# Patient Record
Sex: MALE | Race: WHITE | Employment: OTHER | URBAN - METROPOLITAN AREA
[De-identification: names, ages, dates, MRNs, and addresses within clinical notes are randomized per-mention and may not be internally consistent; named-entity substitution may affect disease eponyms.]

---

## 2021-10-25 ENCOUNTER — HOSPITAL ENCOUNTER (INPATIENT)
Age: 71
LOS: 5 days | Discharge: HOME | DRG: 291 | End: 2021-10-30
Attending: EMERGENCY MEDICINE | Admitting: FAMILY MEDICINE
Payer: MEDICARE

## 2021-10-25 ENCOUNTER — APPOINTMENT (OUTPATIENT)
Dept: CT IMAGING | Age: 71
DRG: 291 | End: 2021-10-25
Payer: MEDICARE

## 2021-10-25 ENCOUNTER — APPOINTMENT (OUTPATIENT)
Dept: GENERAL RADIOLOGY | Age: 71
DRG: 291 | End: 2021-10-25
Payer: MEDICARE

## 2021-10-25 DIAGNOSIS — I50.9 DECOMPENSATED HEART FAILURE (HCC): Primary | ICD-10-CM

## 2021-10-25 DIAGNOSIS — J90 PLEURAL EFFUSION: ICD-10-CM

## 2021-10-25 DIAGNOSIS — R18.8 OTHER ASCITES: ICD-10-CM

## 2021-10-25 LAB
ABSOLUTE EOS #: 0.09 K/UL (ref 0–0.44)
ABSOLUTE IMMATURE GRANULOCYTE: 0.09 K/UL (ref 0–0.3)
ABSOLUTE LYMPH #: 0.95 K/UL (ref 1.1–3.7)
ABSOLUTE MONO #: 1.03 K/UL (ref 0.1–1.2)
ANION GAP SERPL CALCULATED.3IONS-SCNC: 15 MMOL/L (ref 9–17)
BASOPHILS # BLD: 1 % (ref 0–2)
BASOPHILS ABSOLUTE: 0.09 K/UL (ref 0–0.2)
BNP INTERPRETATION: ABNORMAL
BUN BLDV-MCNC: 53 MG/DL (ref 8–23)
BUN/CREAT BLD: 32 (ref 9–20)
CALCIUM SERPL-MCNC: 8.9 MG/DL (ref 8.6–10.4)
CHLORIDE BLD-SCNC: 100 MMOL/L (ref 98–107)
CO2: 19 MMOL/L (ref 20–31)
CREAT SERPL-MCNC: 1.65 MG/DL (ref 0.7–1.2)
DIFFERENTIAL TYPE: ABNORMAL
EOSINOPHILS RELATIVE PERCENT: 1 % (ref 1–4)
GFR AFRICAN AMERICAN: 50 ML/MIN
GFR NON-AFRICAN AMERICAN: 41 ML/MIN
GFR SERPL CREATININE-BSD FRML MDRD: ABNORMAL ML/MIN/{1.73_M2}
GFR SERPL CREATININE-BSD FRML MDRD: ABNORMAL ML/MIN/{1.73_M2}
GLUCOSE BLD-MCNC: 217 MG/DL (ref 70–99)
HCT VFR BLD CALC: 26.7 % (ref 40.7–50.3)
HEMOGLOBIN: 7.9 G/DL (ref 13–17)
IMMATURE GRANULOCYTES: 1 %
INR BLD: 1.7
LACTIC ACID: 3.7 MMOL/L (ref 0.5–2.2)
LYMPHOCYTES # BLD: 11 % (ref 24–43)
MAGNESIUM: 2.1 MG/DL (ref 1.6–2.6)
MCH RBC QN AUTO: 21.8 PG (ref 25.2–33.5)
MCHC RBC AUTO-ENTMCNC: 29.6 G/DL (ref 28.4–34.8)
MCV RBC AUTO: 73.8 FL (ref 82.6–102.9)
MONOCYTES # BLD: 12 % (ref 3–12)
MORPHOLOGY: ABNORMAL
MORPHOLOGY: ABNORMAL
NRBC AUTOMATED: 0 PER 100 WBC
PARTIAL THROMBOPLASTIN TIME: 49.6 SEC (ref 23.9–33.8)
PDW BLD-RTO: 20.6 % (ref 11.8–14.4)
PLATELET # BLD: 340 K/UL (ref 138–453)
PLATELET ESTIMATE: ABNORMAL
PMV BLD AUTO: 9.8 FL (ref 8.1–13.5)
POTASSIUM SERPL-SCNC: 4.3 MMOL/L (ref 3.7–5.3)
PRO-BNP: 3171 PG/ML
PROTHROMBIN TIME: 19.2 SEC (ref 11.5–14.2)
RBC # BLD: 3.62 M/UL (ref 4.21–5.77)
RBC # BLD: ABNORMAL 10*6/UL
SEG NEUTROPHILS: 74 % (ref 36–65)
SEGMENTED NEUTROPHILS ABSOLUTE COUNT: 6.35 K/UL (ref 1.5–8.1)
SODIUM BLD-SCNC: 134 MMOL/L (ref 135–144)
TROPONIN INTERP: ABNORMAL
TROPONIN T: ABNORMAL NG/ML
TROPONIN, HIGH SENSITIVITY: 58 NG/L (ref 0–22)
WBC # BLD: 8.6 K/UL (ref 3.5–11.3)
WBC # BLD: ABNORMAL 10*3/UL

## 2021-10-25 PROCEDURE — 99283 EMERGENCY DEPT VISIT LOW MDM: CPT

## 2021-10-25 PROCEDURE — 74176 CT ABD & PELVIS W/O CONTRAST: CPT

## 2021-10-25 PROCEDURE — 2580000003 HC RX 258: Performed by: EMERGENCY MEDICINE

## 2021-10-25 PROCEDURE — 83735 ASSAY OF MAGNESIUM: CPT

## 2021-10-25 PROCEDURE — 85730 THROMBOPLASTIN TIME PARTIAL: CPT

## 2021-10-25 PROCEDURE — 93005 ELECTROCARDIOGRAM TRACING: CPT | Performed by: EMERGENCY MEDICINE

## 2021-10-25 PROCEDURE — 80048 BASIC METABOLIC PNL TOTAL CA: CPT

## 2021-10-25 PROCEDURE — 83880 ASSAY OF NATRIURETIC PEPTIDE: CPT

## 2021-10-25 PROCEDURE — 81001 URINALYSIS AUTO W/SCOPE: CPT

## 2021-10-25 PROCEDURE — 71045 X-RAY EXAM CHEST 1 VIEW: CPT

## 2021-10-25 PROCEDURE — 96360 HYDRATION IV INFUSION INIT: CPT

## 2021-10-25 PROCEDURE — 85610 PROTHROMBIN TIME: CPT

## 2021-10-25 PROCEDURE — 2060000000 HC ICU INTERMEDIATE R&B

## 2021-10-25 PROCEDURE — 2500000003 HC RX 250 WO HCPCS: Performed by: EMERGENCY MEDICINE

## 2021-10-25 PROCEDURE — 84484 ASSAY OF TROPONIN QUANT: CPT

## 2021-10-25 PROCEDURE — 6360000002 HC RX W HCPCS: Performed by: EMERGENCY MEDICINE

## 2021-10-25 PROCEDURE — 83605 ASSAY OF LACTIC ACID: CPT

## 2021-10-25 PROCEDURE — 85025 COMPLETE CBC W/AUTO DIFF WBC: CPT

## 2021-10-25 RX ORDER — COLCHICINE 0.6 MG/1
0.6 CAPSULE ORAL DAILY
COMMUNITY

## 2021-10-25 RX ORDER — PIOGLITAZONEHYDROCHLORIDE 45 MG/1
45 TABLET ORAL DAILY
Status: ON HOLD | COMMUNITY
End: 2021-10-29 | Stop reason: HOSPADM

## 2021-10-25 RX ORDER — DILTIAZEM HYDROCHLORIDE 5 MG/ML
10 INJECTION INTRAVENOUS ONCE
Status: COMPLETED | OUTPATIENT
Start: 2021-10-25 | End: 2021-10-25

## 2021-10-25 RX ORDER — ATORVASTATIN CALCIUM 10 MG/1
10 TABLET, FILM COATED ORAL DAILY
COMMUNITY

## 2021-10-25 RX ORDER — FUROSEMIDE 10 MG/ML
40 INJECTION INTRAMUSCULAR; INTRAVENOUS ONCE
Status: COMPLETED | OUTPATIENT
Start: 2021-10-25 | End: 2021-10-25

## 2021-10-25 RX ORDER — METOPROLOL SUCCINATE 50 MG/1
50 TABLET, EXTENDED RELEASE ORAL DAILY
COMMUNITY

## 2021-10-25 RX ORDER — 0.9 % SODIUM CHLORIDE 0.9 %
500 INTRAVENOUS SOLUTION INTRAVENOUS ONCE
Status: COMPLETED | OUTPATIENT
Start: 2021-10-25 | End: 2021-10-25

## 2021-10-25 RX ORDER — LOSARTAN POTASSIUM 50 MG/1
50 TABLET ORAL DAILY
Status: ON HOLD | COMMUNITY
End: 2021-10-26 | Stop reason: CLARIF

## 2021-10-25 RX ORDER — FUROSEMIDE 20 MG/1
20 TABLET ORAL DAILY
Status: ON HOLD | COMMUNITY
End: 2021-10-29 | Stop reason: SDUPTHER

## 2021-10-25 RX ORDER — ALLOPURINOL 300 MG/1
300 TABLET ORAL DAILY
Status: ON HOLD | COMMUNITY
End: 2021-10-29 | Stop reason: HOSPADM

## 2021-10-25 RX ADMIN — FUROSEMIDE 40 MG: 10 INJECTION, SOLUTION INTRAMUSCULAR; INTRAVENOUS at 23:10

## 2021-10-25 RX ADMIN — SODIUM CHLORIDE 500 ML: 9 INJECTION, SOLUTION INTRAVENOUS at 21:59

## 2021-10-25 RX ADMIN — DILTIAZEM HYDROCHLORIDE 10 MG: 5 INJECTION INTRAVENOUS at 23:10

## 2021-10-25 ASSESSMENT — ENCOUNTER SYMPTOMS
NAUSEA: 0
RHINORRHEA: 0
SHORTNESS OF BREATH: 1
DIARRHEA: 0
SORE THROAT: 0
VOMITING: 0
EYE DISCHARGE: 0
COLOR CHANGE: 0
COUGH: 0
EYE REDNESS: 0

## 2021-10-26 ENCOUNTER — APPOINTMENT (OUTPATIENT)
Dept: ULTRASOUND IMAGING | Age: 71
DRG: 291 | End: 2021-10-26
Payer: MEDICARE

## 2021-10-26 LAB
-: ABNORMAL
AFP: 0.9 UG/L
ALBUMIN SERPL-MCNC: 3.5 G/DL (ref 3.5–5.2)
ALBUMIN/GLOBULIN RATIO: ABNORMAL (ref 1–2.5)
ALP BLD-CCNC: 139 U/L (ref 40–129)
ALPHA-1 ANTITRYPSIN: 248 MG/DL (ref 90–200)
ALT SERPL-CCNC: 46 U/L (ref 5–41)
AMORPHOUS: ABNORMAL
ANION GAP SERPL CALCULATED.3IONS-SCNC: 13 MMOL/L (ref 9–17)
AST SERPL-CCNC: 57 U/L
BACTERIA: ABNORMAL
BILIRUB SERPL-MCNC: 0.73 MG/DL (ref 0.3–1.2)
BILIRUBIN DIRECT: 0.4 MG/DL
BILIRUBIN URINE: NEGATIVE
BILIRUBIN, INDIRECT: 0.33 MG/DL (ref 0–1)
BUN BLDV-MCNC: 49 MG/DL (ref 8–23)
BUN/CREAT BLD: 34 (ref 9–20)
CALCIUM SERPL-MCNC: 8.5 MG/DL (ref 8.6–10.4)
CASTS UA: ABNORMAL /LPF
CASTS UA: ABNORMAL /LPF
CERULOPLASMIN: 41 MG/DL (ref 15–30)
CHLORIDE BLD-SCNC: 103 MMOL/L (ref 98–107)
CO2: 20 MMOL/L (ref 20–31)
COLOR: YELLOW
COMMENT UA: ABNORMAL
CREAT SERPL-MCNC: 1.45 MG/DL (ref 0.7–1.2)
CRYSTALS, UA: ABNORMAL /HPF
DATE, STOOL #1: NORMAL
DATE, STOOL #2: NORMAL
DATE, STOOL #3: NORMAL
EKG ATRIAL RATE: 125 BPM
EKG ATRIAL RATE: 65 BPM
EKG Q-T INTERVAL: 326 MS
EKG Q-T INTERVAL: 354 MS
EKG QRS DURATION: 90 MS
EKG QRS DURATION: 98 MS
EKG QTC CALCULATION (BAZETT): 447 MS
EKG QTC CALCULATION (BAZETT): 461 MS
EKG R AXIS: -25 DEGREES
EKG R AXIS: -35 DEGREES
EKG T AXIS: 72 DEGREES
EKG T AXIS: 73 DEGREES
EKG VENTRICULAR RATE: 102 BPM
EKG VENTRICULAR RATE: 113 BPM
EPITHELIAL CELLS UA: ABNORMAL /HPF (ref 0–5)
ESTIMATED AVERAGE GLUCOSE: 140 MG/DL
FERRITIN: 51 UG/L (ref 30–400)
FOLATE: 14.4 NG/ML
GFR AFRICAN AMERICAN: 58 ML/MIN
GFR NON-AFRICAN AMERICAN: 48 ML/MIN
GFR SERPL CREATININE-BSD FRML MDRD: ABNORMAL ML/MIN/{1.73_M2}
GFR SERPL CREATININE-BSD FRML MDRD: ABNORMAL ML/MIN/{1.73_M2}
GLOBULIN: ABNORMAL G/DL (ref 1.5–3.8)
GLUCOSE BLD-MCNC: 118 MG/DL (ref 75–110)
GLUCOSE BLD-MCNC: 130 MG/DL (ref 75–110)
GLUCOSE BLD-MCNC: 143 MG/DL (ref 70–99)
GLUCOSE BLD-MCNC: 146 MG/DL (ref 75–110)
GLUCOSE BLD-MCNC: 190 MG/DL (ref 75–110)
GLUCOSE URINE: NEGATIVE
HAV IGM SER IA-ACNC: NONREACTIVE
HBA1C MFR BLD: 6.5 % (ref 4–6)
HCT VFR BLD CALC: 23.2 % (ref 40.7–50.3)
HCT VFR BLD CALC: 27.8 % (ref 40.7–50.3)
HEMOCCULT SP1 STL QL: NEGATIVE
HEMOCCULT SP2 STL QL: NORMAL
HEMOCCULT SP3 STL QL: NORMAL
HEMOGLOBIN: 6.8 G/DL (ref 13–17)
HEMOGLOBIN: 8.1 G/DL (ref 13–17)
HEPATITIS B CORE IGM ANTIBODY: NONREACTIVE
HEPATITIS B SURFACE ANTIGEN: NONREACTIVE
HEPATITIS C ANTIBODY: NONREACTIVE
IGA: 453 MG/DL (ref 70–400)
IGM: 39 MG/DL (ref 40–230)
IRON SATURATION: 5 % (ref 20–55)
IRON: 17 UG/DL (ref 59–158)
KETONES, URINE: NEGATIVE
LACTIC ACID: 2.1 MMOL/L (ref 0.5–2.2)
LEUKOCYTE ESTERASE, URINE: NEGATIVE
LV EF: 60 %
LVEF MODALITY: NORMAL
MAGNESIUM: 2 MG/DL (ref 1.6–2.6)
MCH RBC QN AUTO: 21.3 PG (ref 25.2–33.5)
MCHC RBC AUTO-ENTMCNC: 29.3 G/DL (ref 28.4–34.8)
MCV RBC AUTO: 72.5 FL (ref 82.6–102.9)
MUCUS: ABNORMAL
NITRITE, URINE: NEGATIVE
NRBC AUTOMATED: 0 PER 100 WBC
OTHER OBSERVATIONS UA: ABNORMAL
PDW BLD-RTO: 20.3 % (ref 11.8–14.4)
PH UA: 5 (ref 5–8)
PLATELET # BLD: 310 K/UL (ref 138–453)
PMV BLD AUTO: 9.4 FL (ref 8.1–13.5)
POTASSIUM SERPL-SCNC: 3.6 MMOL/L (ref 3.7–5.3)
PROTEIN UA: NEGATIVE
RBC # BLD: 3.2 M/UL (ref 4.21–5.77)
RBC UA: ABNORMAL /HPF (ref 0–2)
RENAL EPITHELIAL, UA: ABNORMAL /HPF
SODIUM BLD-SCNC: 136 MMOL/L (ref 135–144)
SPECIFIC GRAVITY UA: 1.01 (ref 1–1.03)
TIME, STOOL #1: 1420
TIME, STOOL #2: NORMAL
TIME, STOOL #3: NORMAL
TOTAL IRON BINDING CAPACITY: 339 UG/DL (ref 250–450)
TOTAL PROTEIN: 6.8 G/DL (ref 6.4–8.3)
TRICHOMONAS: ABNORMAL
TROPONIN INTERP: ABNORMAL
TROPONIN T: ABNORMAL NG/ML
TROPONIN, HIGH SENSITIVITY: 54 NG/L (ref 0–22)
TURBIDITY: CLEAR
UNSATURATED IRON BINDING CAPACITY: 322 UG/DL (ref 112–347)
URINE HGB: ABNORMAL
UROBILINOGEN, URINE: NORMAL
WBC # BLD: 7.1 K/UL (ref 3.5–11.3)
WBC UA: ABNORMAL /HPF (ref 0–5)
YEAST: ABNORMAL

## 2021-10-26 PROCEDURE — 82746 ASSAY OF FOLIC ACID SERUM: CPT

## 2021-10-26 PROCEDURE — 82784 ASSAY IGA/IGD/IGG/IGM EACH: CPT

## 2021-10-26 PROCEDURE — 83540 ASSAY OF IRON: CPT

## 2021-10-26 PROCEDURE — 82272 OCCULT BLD FECES 1-3 TESTS: CPT

## 2021-10-26 PROCEDURE — 86663 EPSTEIN-BARR ANTIBODY: CPT

## 2021-10-26 PROCEDURE — 84484 ASSAY OF TROPONIN QUANT: CPT

## 2021-10-26 PROCEDURE — 86665 EPSTEIN-BARR CAPSID VCA: CPT

## 2021-10-26 PROCEDURE — 36415 COLL VENOUS BLD VENIPUNCTURE: CPT

## 2021-10-26 PROCEDURE — 2580000003 HC RX 258: Performed by: FAMILY MEDICINE

## 2021-10-26 PROCEDURE — 86920 COMPATIBILITY TEST SPIN: CPT

## 2021-10-26 PROCEDURE — 87040 BLOOD CULTURE FOR BACTERIA: CPT

## 2021-10-26 PROCEDURE — 86645 CMV ANTIBODY IGM: CPT

## 2021-10-26 PROCEDURE — 76705 ECHO EXAM OF ABDOMEN: CPT

## 2021-10-26 PROCEDURE — 83605 ASSAY OF LACTIC ACID: CPT

## 2021-10-26 PROCEDURE — 86038 ANTINUCLEAR ANTIBODIES: CPT

## 2021-10-26 PROCEDURE — 36430 TRANSFUSION BLD/BLD COMPNT: CPT

## 2021-10-26 PROCEDURE — 85018 HEMOGLOBIN: CPT

## 2021-10-26 PROCEDURE — 86376 MICROSOMAL ANTIBODY EACH: CPT

## 2021-10-26 PROCEDURE — 82105 ALPHA-FETOPROTEIN SERUM: CPT

## 2021-10-26 PROCEDURE — 99222 1ST HOSP IP/OBS MODERATE 55: CPT | Performed by: INTERNAL MEDICINE

## 2021-10-26 PROCEDURE — 93010 ELECTROCARDIOGRAM REPORT: CPT | Performed by: INTERNAL MEDICINE

## 2021-10-26 PROCEDURE — 83516 IMMUNOASSAY NONANTIBODY: CPT

## 2021-10-26 PROCEDURE — 2060000000 HC ICU INTERMEDIATE R&B

## 2021-10-26 PROCEDURE — 82728 ASSAY OF FERRITIN: CPT

## 2021-10-26 PROCEDURE — 82390 ASSAY OF CERULOPLASMIN: CPT

## 2021-10-26 PROCEDURE — 83036 HEMOGLOBIN GLYCOSYLATED A1C: CPT

## 2021-10-26 PROCEDURE — 80048 BASIC METABOLIC PNL TOTAL CA: CPT

## 2021-10-26 PROCEDURE — 85014 HEMATOCRIT: CPT

## 2021-10-26 PROCEDURE — 86225 DNA ANTIBODY NATIVE: CPT

## 2021-10-26 PROCEDURE — 83735 ASSAY OF MAGNESIUM: CPT

## 2021-10-26 PROCEDURE — 80074 ACUTE HEPATITIS PANEL: CPT

## 2021-10-26 PROCEDURE — 6370000000 HC RX 637 (ALT 250 FOR IP): Performed by: FAMILY MEDICINE

## 2021-10-26 PROCEDURE — 80076 HEPATIC FUNCTION PANEL: CPT

## 2021-10-26 PROCEDURE — 85027 COMPLETE CBC AUTOMATED: CPT

## 2021-10-26 PROCEDURE — APPNB30 APP NON BILLABLE TIME 0-30 MINS: Performed by: NURSE PRACTITIONER

## 2021-10-26 PROCEDURE — 83550 IRON BINDING TEST: CPT

## 2021-10-26 PROCEDURE — 2500000003 HC RX 250 WO HCPCS: Performed by: FAMILY MEDICINE

## 2021-10-26 PROCEDURE — 86850 RBC ANTIBODY SCREEN: CPT

## 2021-10-26 PROCEDURE — 86900 BLOOD TYPING SEROLOGIC ABO: CPT

## 2021-10-26 PROCEDURE — P9016 RBC LEUKOCYTES REDUCED: HCPCS

## 2021-10-26 PROCEDURE — 82103 ALPHA-1-ANTITRYPSIN TOTAL: CPT

## 2021-10-26 PROCEDURE — 93306 TTE W/DOPPLER COMPLETE: CPT

## 2021-10-26 PROCEDURE — 82947 ASSAY GLUCOSE BLOOD QUANT: CPT

## 2021-10-26 PROCEDURE — 93005 ELECTROCARDIOGRAM TRACING: CPT | Performed by: EMERGENCY MEDICINE

## 2021-10-26 PROCEDURE — 86644 CMV ANTIBODY: CPT

## 2021-10-26 PROCEDURE — C9113 INJ PANTOPRAZOLE SODIUM, VIA: HCPCS | Performed by: FAMILY MEDICINE

## 2021-10-26 PROCEDURE — 6360000002 HC RX W HCPCS: Performed by: FAMILY MEDICINE

## 2021-10-26 PROCEDURE — 86901 BLOOD TYPING SEROLOGIC RH(D): CPT

## 2021-10-26 PROCEDURE — 86664 EPSTEIN-BARR NUCLEAR ANTIGEN: CPT

## 2021-10-26 RX ORDER — ZOLPIDEM TARTRATE 5 MG/1
5 TABLET ORAL NIGHTLY PRN
Status: DISCONTINUED | OUTPATIENT
Start: 2021-10-27 | End: 2021-10-26

## 2021-10-26 RX ORDER — METOPROLOL TARTRATE 5 MG/5ML
5 INJECTION INTRAVENOUS EVERY 6 HOURS
Status: DISCONTINUED | OUTPATIENT
Start: 2021-10-26 | End: 2021-10-26

## 2021-10-26 RX ORDER — ACETAMINOPHEN 650 MG/1
650 SUPPOSITORY RECTAL EVERY 6 HOURS PRN
Status: DISCONTINUED | OUTPATIENT
Start: 2021-10-26 | End: 2021-10-30 | Stop reason: HOSPADM

## 2021-10-26 RX ORDER — ONDANSETRON 2 MG/ML
4 INJECTION INTRAMUSCULAR; INTRAVENOUS EVERY 6 HOURS PRN
Status: DISCONTINUED | OUTPATIENT
Start: 2021-10-26 | End: 2021-10-30 | Stop reason: HOSPADM

## 2021-10-26 RX ORDER — POTASSIUM CHLORIDE 7.45 MG/ML
10 INJECTION INTRAVENOUS PRN
Status: DISCONTINUED | OUTPATIENT
Start: 2021-10-26 | End: 2021-10-30 | Stop reason: HOSPADM

## 2021-10-26 RX ORDER — ACETAMINOPHEN 325 MG/1
650 TABLET ORAL EVERY 6 HOURS PRN
Status: DISCONTINUED | OUTPATIENT
Start: 2021-10-26 | End: 2021-10-30 | Stop reason: HOSPADM

## 2021-10-26 RX ORDER — DEXTROSE MONOHYDRATE 50 MG/ML
100 INJECTION, SOLUTION INTRAVENOUS PRN
Status: DISCONTINUED | OUTPATIENT
Start: 2021-10-26 | End: 2021-10-30 | Stop reason: HOSPADM

## 2021-10-26 RX ORDER — DEXTROSE MONOHYDRATE 25 G/50ML
12.5 INJECTION, SOLUTION INTRAVENOUS PRN
Status: DISCONTINUED | OUTPATIENT
Start: 2021-10-26 | End: 2021-10-30 | Stop reason: HOSPADM

## 2021-10-26 RX ORDER — SODIUM CHLORIDE 0.9 % (FLUSH) 0.9 %
5-40 SYRINGE (ML) INJECTION PRN
Status: DISCONTINUED | OUTPATIENT
Start: 2021-10-26 | End: 2021-10-30 | Stop reason: HOSPADM

## 2021-10-26 RX ORDER — METOPROLOL SUCCINATE 50 MG/1
50 TABLET, EXTENDED RELEASE ORAL DAILY
Status: DISCONTINUED | OUTPATIENT
Start: 2021-10-26 | End: 2021-10-30 | Stop reason: HOSPADM

## 2021-10-26 RX ORDER — ALLOPURINOL 300 MG/1
300 TABLET ORAL DAILY
Status: DISCONTINUED | OUTPATIENT
Start: 2021-10-26 | End: 2021-10-26

## 2021-10-26 RX ORDER — NICOTINE POLACRILEX 4 MG
15 LOZENGE BUCCAL PRN
Status: DISCONTINUED | OUTPATIENT
Start: 2021-10-26 | End: 2021-10-30 | Stop reason: HOSPADM

## 2021-10-26 RX ORDER — SODIUM CHLORIDE 0.9 % (FLUSH) 0.9 %
5-40 SYRINGE (ML) INJECTION EVERY 12 HOURS SCHEDULED
Status: DISCONTINUED | OUTPATIENT
Start: 2021-10-26 | End: 2021-10-26

## 2021-10-26 RX ORDER — SODIUM CHLORIDE 0.9 % (FLUSH) 0.9 %
5-40 SYRINGE (ML) INJECTION PRN
Status: DISCONTINUED | OUTPATIENT
Start: 2021-10-26 | End: 2021-10-26

## 2021-10-26 RX ORDER — SODIUM CHLORIDE 9 MG/ML
INJECTION, SOLUTION INTRAVENOUS PRN
Status: DISCONTINUED | OUTPATIENT
Start: 2021-10-26 | End: 2021-10-30 | Stop reason: HOSPADM

## 2021-10-26 RX ORDER — ATORVASTATIN CALCIUM 10 MG/1
10 TABLET, FILM COATED ORAL DAILY
Status: DISCONTINUED | OUTPATIENT
Start: 2021-10-26 | End: 2021-10-30 | Stop reason: HOSPADM

## 2021-10-26 RX ORDER — FUROSEMIDE 10 MG/ML
20 INJECTION INTRAMUSCULAR; INTRAVENOUS ONCE
Status: COMPLETED | OUTPATIENT
Start: 2021-10-26 | End: 2021-10-26

## 2021-10-26 RX ORDER — MAGNESIUM SULFATE 1 G/100ML
1000 INJECTION INTRAVENOUS PRN
Status: DISCONTINUED | OUTPATIENT
Start: 2021-10-26 | End: 2021-10-30 | Stop reason: HOSPADM

## 2021-10-26 RX ORDER — POTASSIUM CHLORIDE 20 MEQ/1
40 TABLET, EXTENDED RELEASE ORAL PRN
Status: DISCONTINUED | OUTPATIENT
Start: 2021-10-26 | End: 2021-10-30 | Stop reason: HOSPADM

## 2021-10-26 RX ORDER — SODIUM CHLORIDE 0.9 % (FLUSH) 0.9 %
5-40 SYRINGE (ML) INJECTION EVERY 12 HOURS SCHEDULED
Status: DISCONTINUED | OUTPATIENT
Start: 2021-10-26 | End: 2021-10-30 | Stop reason: HOSPADM

## 2021-10-26 RX ORDER — ONDANSETRON 4 MG/1
4 TABLET, ORALLY DISINTEGRATING ORAL EVERY 8 HOURS PRN
Status: DISCONTINUED | OUTPATIENT
Start: 2021-10-26 | End: 2021-10-30 | Stop reason: HOSPADM

## 2021-10-26 RX ORDER — ACETAMINOPHEN 325 MG/1
650 TABLET ORAL EVERY 4 HOURS PRN
Status: DISCONTINUED | OUTPATIENT
Start: 2021-10-26 | End: 2021-10-26

## 2021-10-26 RX ORDER — PANTOPRAZOLE SODIUM 40 MG/10ML
40 INJECTION, POWDER, LYOPHILIZED, FOR SOLUTION INTRAVENOUS DAILY
Status: DISCONTINUED | OUTPATIENT
Start: 2021-10-26 | End: 2021-10-30 | Stop reason: CLARIF

## 2021-10-26 RX ORDER — POLYETHYLENE GLYCOL 3350 17 G/17G
17 POWDER, FOR SOLUTION ORAL DAILY PRN
Status: DISCONTINUED | OUTPATIENT
Start: 2021-10-26 | End: 2021-10-30 | Stop reason: HOSPADM

## 2021-10-26 RX ORDER — ALLOPURINOL 100 MG/1
100 TABLET ORAL DAILY
Status: DISCONTINUED | OUTPATIENT
Start: 2021-10-26 | End: 2021-10-30 | Stop reason: HOSPADM

## 2021-10-26 RX ORDER — LOSARTAN POTASSIUM 50 MG/1
50 TABLET ORAL DAILY
Status: DISCONTINUED | OUTPATIENT
Start: 2021-10-26 | End: 2021-10-30 | Stop reason: HOSPADM

## 2021-10-26 RX ORDER — INSULIN GLARGINE 100 [IU]/ML
30 INJECTION, SOLUTION SUBCUTANEOUS NIGHTLY
Status: DISCONTINUED | OUTPATIENT
Start: 2021-10-26 | End: 2021-10-30 | Stop reason: HOSPADM

## 2021-10-26 RX ORDER — SODIUM CHLORIDE 9 MG/ML
25 INJECTION, SOLUTION INTRAVENOUS PRN
Status: DISCONTINUED | OUTPATIENT
Start: 2021-10-26 | End: 2021-10-26

## 2021-10-26 RX ORDER — ZOLPIDEM TARTRATE 5 MG/1
5 TABLET ORAL NIGHTLY PRN
Status: DISCONTINUED | OUTPATIENT
Start: 2021-10-26 | End: 2021-10-30 | Stop reason: HOSPADM

## 2021-10-26 RX ORDER — SODIUM CHLORIDE 9 MG/ML
25 INJECTION, SOLUTION INTRAVENOUS PRN
Status: DISCONTINUED | OUTPATIENT
Start: 2021-10-26 | End: 2021-10-30 | Stop reason: HOSPADM

## 2021-10-26 RX ADMIN — ATORVASTATIN CALCIUM 10 MG: 10 TABLET, FILM COATED ORAL at 10:43

## 2021-10-26 RX ADMIN — ALLOPURINOL 100 MG: 100 TABLET ORAL at 10:42

## 2021-10-26 RX ADMIN — METOPROLOL SUCCINATE 50 MG: 50 TABLET, EXTENDED RELEASE ORAL at 10:43

## 2021-10-26 RX ADMIN — FUROSEMIDE 20 MG: 10 INJECTION, SOLUTION INTRAMUSCULAR; INTRAVENOUS at 06:49

## 2021-10-26 RX ADMIN — FUROSEMIDE 20 MG: 10 INJECTION, SOLUTION INTRAMUSCULAR; INTRAVENOUS at 12:43

## 2021-10-26 RX ADMIN — INSULIN LISPRO 2 UNITS: 100 INJECTION, SOLUTION INTRAVENOUS; SUBCUTANEOUS at 12:39

## 2021-10-26 RX ADMIN — PANTOPRAZOLE SODIUM 40 MG: 40 INJECTION, POWDER, FOR SOLUTION INTRAVENOUS at 10:43

## 2021-10-26 RX ADMIN — ZOLPIDEM TARTRATE 5 MG: 5 TABLET ORAL at 22:18

## 2021-10-26 RX ADMIN — METOPROLOL TARTRATE 5 MG: 5 INJECTION INTRAVENOUS at 02:30

## 2021-10-26 RX ADMIN — INSULIN GLARGINE 30 UNITS: 100 INJECTION, SOLUTION SUBCUTANEOUS at 22:18

## 2021-10-26 RX ADMIN — SODIUM CHLORIDE, PRESERVATIVE FREE 10 ML: 5 INJECTION INTRAVENOUS at 22:20

## 2021-10-26 RX ADMIN — SODIUM CHLORIDE, PRESERVATIVE FREE 10 ML: 5 INJECTION INTRAVENOUS at 10:43

## 2021-10-26 ASSESSMENT — PAIN SCALES - GENERAL: PAINLEVEL_OUTOF10: 0

## 2021-10-26 NOTE — PROGRESS NOTES
Pt. alcon hgb came back at 6.8. Dr. Nalini Davila called and will type and cross 2 units and run one now. Cardiology consulted and in to see patient. GI consult placed as well. Questioning GI bleed.

## 2021-10-26 NOTE — PROGRESS NOTES
Comprehensive Nutrition Assessment    Type and Reason for Visit:  Initial, Positive Nutrition Screen, Patient Education (Decreased appetite; malnutrition score:1)    Nutrition Recommendations/Plan:   1. Continue ADULT DIET; Regular; 4 carb choices (60 gm/meal); Low Fat/Low Chol/High Fiber/KARIN; Low Sodium (2 gm); 1800 ml  2. Monitor po intakes, need for ONS, weights and labs    Nutrition Assessment:  weakness, shortness of breath for the last several weeks. He was found to be anemic with hgb of 6.8- received blood transfusion. Plan for egd and colonoscopy. Patient also admitted for CHF. Patient wife at bedside- reports he had poor appetite/intake x1-2 weeks. He reports consuming 1 bowl of cereal and \"maybe few snacks but that was it\". No wt loss during this time althought he had significant fluid retention. Discussed CHF diet recommendations and monitoring sodium intake. Discussed ONS intake 1x/d for protein needs (especially when he goes days with minimal oral intake). Pt dtr works for [employer] and is able to get ONS. Discussed current diet along with fluid restriction. Explained reason for fluid restriction and encouraged to ask if he is to go home on fluid restriction. PO intake 50-75% this date. Will monitor po intakes, need for ONS, weights and labs. Malnutrition Assessment:  Malnutrition Status: At risk for malnutrition (Comment)    Context:  Acute Illness     Findings of the 6 clinical characteristics of malnutrition:  Energy Intake:  1 - 75% or less of estimated energy requirements for 7 or more days  Weight Loss:  No significant weight loss     Body Fat Loss:  Unable to assess     Muscle Mass Loss:  Unable to assess    Fluid Accumulation:  1 - Mild Extremities   Strength:  Not Performed    Estimated Daily Nutrient Needs:  Energy (kcal):  3023-5614 kcal (16-18 kcal/kg); Weight Used for Energy Requirements:  Current     Protein (g):   gm (1.2-1.4 g/kg);  Weight Used for Protein Requirements: Ideal        Fluid (ml/day):  1800 mL fluid restriction per physician; Method Used for Fluid Requirements:  Other (Comment)      Nutrition Related Findings:  Hgb 6.8 - received 1uPRBC. Active bowels- diarrhea. BLE +1 pitting edema. Wounds:  None       Current Nutrition Therapies:    ADULT DIET; Regular; 4 carb choices (60 gm/meal); Low Fat/Low Chol/High Fiber/KARIN; Low Sodium (2 gm); 1800 ml    Anthropometric Measures:  · Height: 6' (182.9 cm)  · Current Body Weight: 250 lb (113.4 kg)   · Admission Body Weight: 250 lb (113.4 kg)    · Usual Body Weight: 250 lb (113.4 kg)     · Ideal Body Weight: 178 lbs; % Ideal Body Weight 140.4 %   · BMI: 33.9  · Adjusted Body Weight:  ; No Adjustment  · BMI Categories: Obese Class 1 (BMI 30.0-34. 9)       Nutrition Diagnosis:   · Inadequate protein-energy intake related to inadequate protein-energy intake as evidenced by intake 51-75%, poor intake prior to admission    Nutrition Interventions:   Food and/or Nutrient Delivery:  Continue Current Diet  Nutrition Education/Counseling:  Education completed   Coordination of Nutrition Care:  Continue to monitor while inpatient    Goals:  PO intakes to provide >75% of estimated nutritional needs       Nutrition Monitoring and Evaluation:   Behavioral-Environmental Outcomes:  None Identified   Food/Nutrient Intake Outcomes:  Food and Nutrient Intake  Physical Signs/Symptoms Outcomes:  Biochemical Data, Weight, Fluid Status or Edema, Skin     Discharge Planning:    Continue current diet     Jean Luo, [address] [place] [address]  Office Number: [phone]

## 2021-10-26 NOTE — PLAN OF CARE
PRE CONSULT ROUNDING NOTE  HPI  69year old male with pmh of afib on eliquis dm who presented to the ED for weakness, shortness of breath for the last several weeks. He was found to be anemic with hgb of 6.8. he denies prior hx of anemia. Receiving blood transfusion now. Does not take NSAIDS. No melena hematochezia or hematemesis. inr 1.7 creat 1.45 alk phos 139 alt 46 ast 57 direct bili at 0.40 gallbladder us today shows non specific gallbladder wall thickening with trace ascites and possible chronic liver disease. Ct abd showed bilateral pleural effusions with non calcified lung nodules bilaterally, cardiomegaly hepatomegaly with slightly nodular liver diverticulosis and gallbladder wall thickening. He denies fevers chills weight loss dysphagia change in the bowel habits rash.      Endoscopy no egd states he had benign colon polyp removed several years ago  Family reports no hx of liver pancreatic stomach or colon cancer no uc/crohns  Social quit smoking drinks etoh once a month at the [facility] no illicit drugs   BP (!) 141/63   Pulse 89   Temp 98 °F (36.7 °C) (Oral)   Resp 16   Ht 6' (1.829 m)   Wt 250 lb (113.4 kg)   SpO2 99%   BMI 33.91 kg/m²      ROS as above meds labs imaging and past medical records were reviewed    Exam  General Appearance: alert and oriented to person, place and time, well-developed and well-nourished, in no acute distress  Skin: warm and dry, no rash or erythema  Head: normocephalic and atraumatic  Eyes: pupils equal, round, and reactive to light, extraocular eye movements intact, conjunctivae normal  ENT: hearing grossly normal bilaterally  Neck: neck supple and non tender without mass, no thyromegaly or thyroid nodules, no cervical lymphadenopathy   Pulmonary/Chest: clear to auscultation bilaterally- no wheezes, rales or rhonchi, normal air movement, no respiratory distress  Cardiovascular: normal rate, regular rhythm, normal S1 and S2, no murmurs, rubs, clicks or gallops, distal

## 2021-10-26 NOTE — ED PROVIDER NOTES
EMERGENCY DEPARTMENT ENCOUNTER    Pt Name: Mayra Chaidez  MRN: [id number]  [place] 1950  Date of evaluation: 10/25/21  CHIEF COMPLAINT       Chief Complaint   Patient presents with    Other     pt states he thinks hes anemic and his dr told him to go to ER to get checked out    Atrial Fibrillation     HISTORY OF PRESENT ILLNESS   69-year-old male presents with complaints of generalized weakness, shortness of breath and not feeling well. The patient was sent here by the family physician who stated that he looked extremely pale and was concerned about his hemoglobin. The patient denies any chest pain but does have some shortness of breath and states that she feels very ill. Patient denies any nausea vomiting fevers or chills. Patient has not noticed any blood in his stool or blood in the urine. REVIEW OF SYSTEMS     Review of Systems   Constitutional: Positive for fatigue. Negative for chills and fever. HENT: Negative for rhinorrhea and sore throat. Eyes: Negative for discharge, redness and visual disturbance. Respiratory: Positive for shortness of breath. Negative for cough. Cardiovascular: Negative for chest pain, palpitations and leg swelling. Gastrointestinal: Negative for diarrhea, nausea and vomiting. Musculoskeletal: Negative for arthralgias, myalgias and neck pain. Skin: Negative for color change and rash. Neurological: Positive for weakness. Negative for seizures and headaches. Psychiatric/Behavioral: Negative for hallucinations, self-injury and suicidal ideas. PASTMEDICAL HISTORY     Past Medical History:   Diagnosis Date    [facility]rial fibrillation ([facility]     Diabetes mellitus ([facility]      Past Problem List  Patient Active Problem List   Diagnosis Code    Decompensated heart failure ([facility] I50.9    Anemia D64.9    Elevated LFTs R79.89     SURGICAL HISTORY     History reviewed. No pertinent surgical history.   CURRENT MEDICATIONS       Current Discharge Medication List      CONTINUE these medications which have NOT CHANGED    Details   Semaglutide, 1 MG/DOSE, (OZEMPIC, 1 MG/DOSE,) 4 MG/3ML SOPN Inject 1 mg into the skin once a week       insulin glargine (BASAGLAR KWIKPEN) 100 UNIT/ML injection pen Inject 32 Units into the skin daily       metFORMIN (GLUCOPHAGE) 850 MG tablet Take 850 mg by mouth 2 times daily (with meals)      apixaban (ELIQUIS) 5 MG TABS tablet Take 5 mg by mouth 2 times daily       atorvastatin (LIPITOR) 10 MG tablet Take 10 mg by mouth daily      furosemide (LASIX) 20 MG tablet Take 20 mg by mouth daily       pioglitazone (ACTOS) 45 MG tablet Take 45 mg by mouth daily      allopurinol (ZYLOPRIM) 300 MG tablet Take 300 mg by mouth daily      colchicine (MITIGARE) 0.6 MG capsule Take 0.6 mg by mouth daily      metoprolol succinate (TOPROL XL) 50 MG extended release tablet Take 50 mg by mouth daily           ALLERGIES     has No Known Allergies. FAMILY HISTORY     has no family status information on file. SOCIAL HISTORY       Social History     Tobacco Use    Smoking status: Former Smoker   Substance Use Topics    Alcohol use: Yes    Drug use: Never     PHYSICAL EXAM     INITIAL VITALS: /73   Pulse 75   Temp 98.2 °F (36.8 °C) (Oral)   Resp 16   Ht 6' (1.829 m)   Wt 250 lb (113.4 kg)   SpO2 98%   BMI 33.91 kg/m²    Physical Exam  Constitutional:       Appearance: Normal appearance. He is well-developed. He is not ill-appearing or toxic-appearing. HENT:      Head: Normocephalic and atraumatic. Eyes:      Conjunctiva/sclera: Conjunctivae normal.      Pupils: Pupils are equal, round, and reactive to light. Neck:      Trachea: Trachea normal.   Cardiovascular:      Rate and Rhythm: Tachycardia present. Rhythm irregularly irregular. Heart sounds: S1 normal and S2 normal. No murmur heard. Pulmonary:      Effort: Pulmonary effort is normal. No accessory muscle usage or respiratory distress.       Breath sounds: Normal breath sounds. Chest:      Chest wall: No deformity or tenderness. Abdominal:      General: Bowel sounds are normal. There is no distension or abdominal bruit. Palpations: Abdomen is not rigid. Tenderness: There is no abdominal tenderness. There is no guarding or rebound. Musculoskeletal:      Cervical back: Normal range of motion and neck supple. Skin:     General: Skin is warm. Findings: No rash. Neurological:      Mental Status: He is alert and oriented to person, place, and time. GCS: GCS eye subscore is 4. GCS verbal subscore is 5. GCS motor subscore is 6. Psychiatric:         Speech: Speech normal.         MEDICAL DECISION MAKIN-year-old male presents with complaints of shortness of breath and generalized weakness. Plan is cardiac enzymes BNP and reevaluation. 10:41 PM EDT  Due to the patient's elevated lactic acid we obtained a CT of the chest and abdomen to evaluate for intraperitoneal and intrathoracic pathology. CRITICAL CARE:       PROCEDURES:    Procedures    DIAGNOSTIC RESULTS   EKG:All EKG's are interpreted by the Emergency Department Physician who either signs or Co-signs this chart in the absence of a cardiologist.    Patient's EKG shows A. fib with RVR, rate of 113, QRS QTC intervals unremarkable patient has left axis deviation no ST elevations or depressions, no significant T wave changes. Nonspecific EKG. RADIOLOGY:All plain film, CT, MRI, and formal ultrasound images (except ED bedside ultrasound) are read by the radiologist, see reports below, unless otherwisenoted in MDM or here. US GALLBLADDER RUQ   Final Result   1. Findings suggestive of chronic liver disease. 2.  Nonspecific gallbladder wall thickening without stones or convincing   evidence for acute cholecystitis. No biliary dilatation. 3.  Trace perihepatic ascites.          CT CHEST ABDOMEN PELVIS WO CONTRAST   Final Result   Bilateral left greater than right layering pleural effusions with adjacent   atelectasis. Small scattered areas of consolidation in the lung bases may be   due to superimposed infection in the appropriate clinical setting. Solid noncalcified nodules in the right upper, right middle, and left lower   lobes. See recommendation below. Cardiomegaly due to biatrial enlargement. Pericardial effusion. Hypodense intravascular blood pool which can be seen in the setting of   anemia. Correlate clinically. Hepatomegaly with suggestion of a slightly micronodular hepatic contour. Query possible underlying cirrhosis. Gallbladder wall thickening which may relate to underlying systemic cause   such as the patient's cardiac or suspected hepatic disease, though clinical   correlation is required to exclude acute cholecystitis. Colonic diverticulosis without evidence of diverticulitis. Small volume ascites, graying of the mesenteric fat, and anasarca. Grade 1 degenerative anterolisthesis L4 on L5 with moderate to severe spinal   canal and severe bilateral neuroforaminal stenosis. RECOMMENDATIONS:   Recommend a non-contrast Chest CT at 3-6 months, then another non-contrast   Chest CT at 18-24 months. Note: This recommendation does not apply to   patients younger than 35 years, immunocompromised patients, and patients with   cancer. F/u in patients with significant comorbidities as clinically   warranted. For lung cancer screening, adhere to Lung-RADS guidelines. Reference: Radiology. 2017 Jul; 284(1):228-243         XR CHEST PORTABLE   Final Result   Minimal left basilar atelectasis. Otherwise, clear lungs. Cardiomegaly. Trace left pleural effusion. LABS: All lab results were reviewed by myself, and all abnormals are listed below.   Labs Reviewed   BASIC METABOLIC PANEL - Abnormal; Notable for the following components:       Result Value    Glucose 217 (*)     BUN 53 (*)     CREATININE 1.65 (*)     Bun/Cre Ratio 32 (*)     Sodium 134 (*)     CO2 19 (*)     GFR Non- 41 (*)     GFR  50 (*)     All other components within normal limits   BRAIN NATRIURETIC PEPTIDE - Abnormal; Notable for the following components:    Pro-BNP 3,171 (*)     All other components within normal limits   CBC WITH AUTO DIFFERENTIAL - Abnormal; Notable for the following components:    RBC 3.62 (*)     Hemoglobin 7.9 (*)     Hematocrit 26.7 (*)     MCV 73.8 (*)     MCH 21.8 (*)     RDW 20.6 (*)     Seg Neutrophils 74 (*)     Lymphocytes 11 (*)     Immature Granulocytes 1 (*)     Absolute Lymph # 0.95 (*)     All other components within normal limits   APTT - Abnormal; Notable for the following components:    PTT 49.6 (*)     All other components within normal limits   PROTIME-INR - Abnormal; Notable for the following components:    Protime 19.2 (*)     All other components within normal limits   TROPONIN - Abnormal; Notable for the following components:    Troponin, High Sensitivity 58 (*)     All other components within normal limits   TROPONIN - Abnormal; Notable for the following components:    Troponin, High Sensitivity 54 (*)     All other components within normal limits   LACTIC ACID - Abnormal; Notable for the following components:    Lactic Acid 3.7 (*)     All other components within normal limits   URINALYSIS - Abnormal; Notable for the following components:    Urine Hgb TRACE (*)     All other components within normal limits   BASIC METABOLIC PANEL - Abnormal; Notable for the following components:    Glucose 143 (*)     BUN 49 (*)     CREATININE 1.45 (*)     Bun/Cre Ratio 34 (*)     Calcium 8.5 (*)     Potassium 3.6 (*)     GFR Non- 48 (*)     GFR  58 (*)     All other components within normal limits   CBC - Abnormal; Notable for the following components:    RBC 3.20 (*)     Hemoglobin 6.8 (*)     Hematocrit 23.2 (*)     MCV 72.5 (*)     MCH 21.3 (*)     RDW 20.3 (*)     All other components within normal limits   MICROSCOPIC URINALYSIS - Abnormal; Notable for the following components:    Bacteria, UA FEW (*)     All other components within normal limits   HEMOGLOBIN A1C - Abnormal; Notable for the following components:    Hemoglobin A1C 6.5 (*)     All other components within normal limits   IRON AND TIBC - Abnormal; Notable for the following components:    Iron 17 (*)     Iron Saturation 5 (*)     All other components within normal limits   HEPATIC FUNCTION PANEL - Abnormal; Notable for the following components:    Alkaline Phosphatase 139 (*)     ALT 46 (*)     AST 57 (*)     Bilirubin, Direct 0.40 (*)     All other components within normal limits   HEMOGLOBIN AND HEMATOCRIT, BLOOD - Abnormal; Notable for the following components:    Hemoglobin 8.1 (*)     Hematocrit 27.8 (*)     All other components within normal limits   POC GLUCOSE FINGERSTICK - Abnormal; Notable for the following components:    POC Glucose 130 (*)     All other components within normal limits   POC GLUCOSE FINGERSTICK - Abnormal; Notable for the following components:    POC Glucose 190 (*)     All other components within normal limits   CULTURE, BLOOD 1   CULTURE, BLOOD 1   MAGNESIUM   LACTIC ACID   MAGNESIUM   BLOOD OCCULT STOOL DIAGNOSTIC   FERRITIN   HEMOGLOBIN A1C   FOLATE   HEPATITIS PANEL, ACUTE   ALPHA-1-ANTITRYPSIN   MADHAVI   AFP TUMOR MARKER   IGA   CERULOPLASMIN   MITOCHONDRIAL ANTIBODIES, M2, IGG   ANTI-PETTY KID ZHANG AB   GIANNI-BARR VIRUS VCA ANTIBODY PANEL   CYTOMEGALOVIRUS ANTIBODY, IGM   CYTOMEGALOVIRUS ANTIBODY, IGG   SMOOTH MUSCLE ANTIBODY QUANT   IGM   POCT GLUCOSE   POCT GLUCOSE   POCT GLUCOSE   POCT GLUCOSE   TYPE AND SCREEN   PREPARE RBC (CROSSMATCH)       EMERGENCY DEPARTMENTCOURSE:         Vitals:    Vitals:    10/26/21 1158 10/26/21 1245 10/26/21 1549 10/26/21 1552   BP: 121/73 127/79 119/73    Pulse: 79 96 75    Resp: 18 12 16    Temp: 98.2 °F (36.8 °C) 98.3 °F (36.8 °C) 98.2 °F (36.8 °C) TempSrc: Oral Oral Oral    SpO2: 95% 99% 98%    Weight:       Height:    6' (1.829 m)       The patient was given the following medications while in the emergency department:  Orders Placed This Encounter   Medications    0.9 % sodium chloride bolus    furosemide (LASIX) injection 40 mg    dilTIAZem injection 10 mg    DISCONTD: sodium chloride flush 0.9 % injection 5-40 mL    DISCONTD: sodium chloride flush 0.9 % injection 5-40 mL    DISCONTD: 0.9 % sodium chloride infusion    DISCONTD: acetaminophen (TYLENOL) tablet 650 mg    OR Linked Order Group     ondansetron (ZOFRAN-ODT) disintegrating tablet 4 mg     ondansetron (ZOFRAN) injection 4 mg    pantoprazole (PROTONIX) injection 40 mg    sodium chloride flush 0.9 % injection 5-40 mL    sodium chloride flush 0.9 % injection 5-40 mL    0.9 % sodium chloride infusion    OR Linked Order Group     potassium chloride (KLOR-CON M) extended release tablet 40 mEq     potassium bicarb-citric acid (EFFER-K) effervescent tablet 40 mEq     potassium chloride 10 mEq/100 mL IVPB (Peripheral Line)    potassium chloride 10 mEq/100 mL IVPB (Peripheral Line)    magnesium sulfate 1000 mg in dextrose 5% 100 mL IVPB    polyethylene glycol (GLYCOLAX) packet 17 g    OR Linked Order Group     acetaminophen (TYLENOL) tablet 650 mg     acetaminophen (TYLENOL) suppository 650 mg    glucose (GLUTOSE) 40 % oral gel 15 g    dextrose 50 % IV solution    glucagon (rDNA) injection 1 mg    dextrose 5 % solution    insulin glargine (LANTUS) injection vial 30 Units    insulin lispro (HUMALOG) injection vial 0-12 Units    DISCONTD: allopurinol (ZYLOPRIM) tablet 300 mg    DISCONTD: apixaban (ELIQUIS) tablet 2.5 mg    atorvastatin (LIPITOR) tablet 10 mg    losartan (COZAAR) tablet 50 mg    metoprolol succinate (TOPROL XL) extended release tablet 50 mg    allopurinol (ZYLOPRIM) tablet 100 mg    apixaban (ELIQUIS) tablet 2.5 mg    DISCONTD: metoprolol (LOPRESSOR) injection 5 mg    furosemide (LASIX) injection 20 mg    furosemide (LASIX) injection 20 mg    0.9 % sodium chloride infusion     CONSULTS:  IP CONSULT TO HOSPITALIST  IP CONSULT TO CARDIOLOGY  IP CONSULT TO GI    FINAL IMPRESSION      1. Decompensated heart failure (HCC)    2. Other ascites    3. Pleural effusion          DISPOSITION/PLAN   DISPOSITION        PATIENT REFERRED TO:  No follow-up provider specified. DISCHARGE MEDICATIONS:  Current Discharge Medication List        The care is provided during an unprecedented national emergency due to the novel coronavirus, COVID 19.   Deana Morin MD  Attending Emergency Physician                   Deana Morin MD  10/26/21 7725

## 2021-10-26 NOTE — FLOWSHEET NOTE
10/26/21 0829   Vitals   /71   Temp 98 °F (36.7 °C)   Pulse 100   Resp 12        10/26/21 0835 10/26/21 0856   Transfuse RBC   Time Blood Reaches Vein 0840  --    Patient Observed Initial 15 Min   --  Yes   Suspected Reaction    --  No        10/26/21 0857   Vitals   BP (!) 141/63   Temp 98 °F (36.7 °C)   Temp Source Oral   Pulse 89   Resp 16   SpO2 99 %     Blood consent was confirmed   Blood was dual signed per writer and an additional RN, Arleen Israel   Blood touched vein @ 0840  Vitals stable. Vitals were taken within 30 minutes prior to blood administration and 15 minutes after start    Patient was observed for first 15 minutes per writer  No reaction noted.  Will continue to monitor

## 2021-10-26 NOTE — CARE COORDINATION
Advance Care Planning     Advance Care Planning Activator (Inpatient)  Conversation Note      Date of ACP Conversation: 10/26/2021     Conversation Conducted with: Patient with Decision Making Capacity    ACP Activator: Dayne George RN        Health Care Decision Maker:     Current Designated Health Care Decision Maker:     Click here to complete Healthcare Decision Makers including section of the Healthcare Decision Maker Relationship (ie \"Primary\")  Today we documented Decision Maker(s) consistent with Legal Next of Kin hierarchy. Care Preferences    Ventilation: \"If you were in your present state of health and suddenly became very ill and were unable to breathe on your own, what would your preference be about the use of a ventilator (breathing machine) if it were available to you? \"      Would the patient desire the use of ventilator (breathing machine)?: yes    \"If your health worsens and it becomes clear that your chance of recovery is unlikely, what would your preference be about the use of a ventilator (breathing machine) if it were available to you? \"     Would the patient desire the use of ventilator (breathing machine)?: Yes      Resuscitation  \"CPR works best to restart the heart when there is a sudden event, like a heart attack, in someone who is otherwise healthy. Unfortunately, CPR does not typically restart the heart for people who have serious health conditions or who are very sick. \"    \"In the event your heart stopped as a result of an underlying serious health condition, would you want attempts to be made to restart your heart (answer \"yes\" for attempt to resuscitate) or would you prefer a natural death (answer \"no\" for do not attempt to resuscitate)? \" yes       [x] Yes   [] No   Educated Patient / Shea Neighbors regarding differences between Advance Directives and portable DNR orders.     Length of ACP Conversation in minutes:      Conversation Outcomes:  [x] ACP discussion completed  [] Existing advance directive reviewed with patient; no changes to patient's previously recorded wishes  [] New Advance Directive completed  [] Portable Do Not Rescitate prepared for Provider review and signature  [] POLST/POST/MOLST/MOST prepared for Provider review and signature      Follow-up plan:    [] Schedule follow-up conversation to continue planning  [] Referred individual to Provider for additional questions/concerns   [] Advised patient/agent/surrogate to review completed ACP document and update if needed with changes in condition, patient preferences or care setting    [x] This note routed to one or more involved healthcare providers

## 2021-10-26 NOTE — PLAN OF CARE
Problem: Discharge Planning:  Goal: Discharged to appropriate level of care  Description: Discharged to appropriate level of care  Outcome: Ongoing     Problem:  Activity Intolerance:  Goal: Ability to tolerate increased activity will improve  Description: Ability to tolerate increased activity will improve  10/26/2021 1228 by Jillian Jimenez RN  Outcome: Ongoing     Problem: Cardiac Output - Decreased:  Goal: Hemodynamic stability will improve  Description: Hemodynamic stability will improve  10/26/2021 1228 by Jillian Jimenez RN  Outcome: Ongoing     Problem: Fluid Volume - Excess:  Goal: Control of fluid volume excess will improve  Description: Control of fluid volume excess will improve  10/26/2021 1228 by Jillian Jimenez RN  Outcome: Ongoing     Problem: Gas Exchange - Impaired:  Goal: Levels of oxygenation will improve  Description: Levels of oxygenation will improve  Outcome: Ongoing     Problem: Mood - Altered:  Goal: Mood stable  Description: Mood stable  Outcome: Ongoing     Problem: Tissue Perfusion - Cardiopulmonary, Altered:  Goal: Absence of angina  Description: Absence of angina  Outcome: Ongoing     Problem: Tissue Perfusion - Cardiopulmonary, Altered:  Goal: Circulation will improve to fullest extent possible  Description: Circulation will improve to fullest extent possible  Outcome: Ongoing     Problem: Tissue Perfusion - Cardiopulmonary, Altered:  Goal: Hemodynamic stability will improve  Description: Hemodynamic stability will improve  Outcome: Ongoing     Problem: Tobacco Use:  Goal: Will participate in inpatient tobacco-use cessation counseling  Description: Will participate in inpatient tobacco-use cessation counseling  Outcome: Ongoing     Problem: Venous Thromboembolism:  Goal: Will show no signs or symptoms of venous thromboembolism  Description: Will show no signs or symptoms of venous thromboembolism  Outcome: Ongoing     Problem: Venous Thromboembolism:  Goal: Absence of signs or symptoms of impaired coagulation  Description: Absence of signs or symptoms of impaired coagulation  Outcome: Ongoing     Problem:  Bowel Function - Altered:  Goal: Bowel elimination is within specified parameters  Description: Bowel elimination is within specified parameters  Outcome: Ongoing     Problem: Fluid Volume - Imbalance:  Goal: Absence of imbalanced fluid volume signs and symptoms  Description: Absence of imbalanced fluid volume signs and symptoms  Outcome: Ongoing     Problem: Nausea/Vomiting:  Goal: Able to eat  Description: Able to eat  Outcome: Ongoing

## 2021-10-26 NOTE — PROGRESS NOTES
Transitions of Care Pharmacy Service   Medication Review    The patient's list of current home medications has been reviewed. Source(s) of information: patient, spouse, surescripts    Based on information provided by the above source(s), I have updated the patient's home med list as described below. I changed or updated the following medications on the patient's home medication list:  Discontinued · Losartan 50mg - error      Added · None      Adjusted   · Ozempic - added strength and sig- inject 1mg weekly (Thursday)  · Furosemide 20mg - changed from 1BID to 1QD     Other Notes · Toprol XL 50mg - Patient states he is still taking this medication and has it at home, last fill shown 01/30/21   · Colchicine 0.6mg - Prescription written as 1BID, patient states he only takes one tablet nightly because he takes his Allopurinol in the morning and doesn't need the morning dose of Colchicine   · Ozempic 1mg - Thursday           Please feel free to call me with any questions about this encounter. Thank you. This note will be reviewed and co-signed by the [facility] Pharmacist. The pharmacist will review inpatient orders and contact the physician about any discrepancies. Reji Jones, pharmacy technician  [facility] Pharmacy Service  Phone:  [phone]  Fax: [phone]      Electronically signed by Reji Jones on 10/26/2021 at 12:31 PM     Prior to Admission medications    Medication Sig Start Date End Date Taking?  Authorizing Provider   Semaglutide, 1 MG/DOSE, (OZEMPIC, 1 MG/DOSE,) 4 MG/3ML SOPN Inject 1 mg into the skin once a week        insulin glargine (BASAGLAR KWIKPEN) 100 UNIT/ML injection pen Inject 32 Units into the skin daily        metFORMIN (GLUCOPHAGE) 850 MG tablet Take 850 mg by mouth 2 times daily (with meals)       apixaban (ELIQUIS) 5 MG TABS tablet Take 5 mg by mouth 2 times daily        atorvastatin (LIPITOR) 10 MG tablet Take 10 mg by mouth daily furosemide (LASIX) 20 MG tablet Take 20 mg by mouth daily        pioglitazone (ACTOS) 45 MG tablet Take 45 mg by mouth daily       allopurinol (ZYLOPRIM) 300 MG tablet Take 300 mg by mouth daily       colchicine (MITIGARE) 0.6 MG capsule Take 0.6 mg by mouth daily       metoprolol succinate (TOPROL XL) 50 MG extended release tablet Take 50 mg by mouth daily            Pt Name: Hellen Licea  MRN: [id number]  [place] 1950    I have reviewed the patient's home medication list and current inpatient orders and agree with the documentation provided by the [facility] pharmacy technician. Medications that need to be addressed by a physician/nurse practitioner:    Medication Action Requested     Allopurinol 100mg daily   Home dose is 300mg daily, please review and adjust as appropriate. Pioglitazone 45mg daily     Colchicine 0.6mg daily     Furosemide 20mg daily    Metformin 850mg BID    Please review and reorder as appropriate. Losartan 50mg daily  Not a current home med- please discontinue if appropriate. Please feel free to call me with any questions about this encounter. Thank you.     Bob Dickerson [facility], PharmD  Pharmacy Medication Accuracy Review Service  Phone:  [phone]  Fax: [phone]      Electronically signed by Bob Dickerson [facility] on 10/26/2021 at 1:54 PM

## 2021-10-26 NOTE — FLOWSHEET NOTE
Patient receives Sacrament of the Sick (anointing) from [facility].    [facility] will follow as needed. (writer charting for [employer]     71/95/82 [id number]   Encounter Summary   Services provided to: Patient   Referral/Consult From: [facility]   Continue Visiting   (10/26/21 anointed)   Complexity of Encounter Low   Length of Encounter 15 minutes   Routine   Type Initial   Sacraments   Sacrament of Sick-Anointing Anointed  (10/26/21 Fr. Ricky Freitas)

## 2021-10-26 NOTE — CONSULTS
Gastroenterology Consult Note      Patient: Juan Alejandro  : 1950  Acct#:  [de-identified]     Date:  10/26/2021    Subjective:       History of Present Illness  Patient is a 69 y.o.  male admitted with Pleural effusion [J90]  Other ascites [R18.8]  Decompensated heart failure ([facility] [address] [I50.9] who is seen in consult for *anemia, elevated liver enzymes/cirrhosis    65-year-old gentleman with history of A. fib for which he is on Eliquis  Came to the emergency room with weakness shortness of breath  Which been happening for several weeks  And he was found to have a hemoglobin 6.8 which is most likely responsible for that. Denied any previous knowledge of anemia in the past.    After transfusion is feeling a lot better  Denied any NSAIDs denied any melena or hematochezia or hematemesis  Yet his INR was 1.7  Creatinine 1.45  Alkaline phosphatase 139  ALT 46  AST 57  Normal bilirubin  Ultrasound today show gallbladder wall thickening with trace ascites and possible chronic liver disease    CT scan of the abdomen showed bilateral pleural effusion with noncalcified lung nodules bilaterally, cardiomegaly, hepatomegaly with slightly nodular liver, diverticulosis and gallbladder wall thickening  Denied any fever or chills or any other GI symptoms  Patient used to drink heavily in the past but at least 20-30 years ago he quit right now he drinks very socially      Previous endoscopy history:  Several years he had a colonoscopy with polyps  No EGD                              Past Medical History:   Diagnosis Date    Atrial fibrillation ([facility] [address]     Diabetes mellitus ([facility] [address]       History reviewed. No pertinent surgical history. Past Endoscopic History as above    Admission Meds  No current facility-administered medications on file prior to encounter.      Current Outpatient Medications on File Prior to Encounter   Medication Sig Dispense Refill    Semaglutide, 1 MG/DOSE, (OZEMPIC, 1 MG/DOSE,) 4 MG/3ML SOPN Inject 1 mg into the skin once a week       insulin glargine (BASAGLAR KWIKPEN) 100 UNIT/ML injection pen Inject 32 Units into the skin daily       metFORMIN (GLUCOPHAGE) 850 MG tablet Take 850 mg by mouth 2 times daily (with meals)      apixaban (ELIQUIS) 5 MG TABS tablet Take 5 mg by mouth 2 times daily       atorvastatin (LIPITOR) 10 MG tablet Take 10 mg by mouth daily      furosemide (LASIX) 20 MG tablet Take 20 mg by mouth daily       pioglitazone (ACTOS) 45 MG tablet Take 45 mg by mouth daily      allopurinol (ZYLOPRIM) 300 MG tablet Take 300 mg by mouth daily      colchicine (MITIGARE) 0.6 MG capsule Take 0.6 mg by mouth daily      metoprolol succinate (TOPROL XL) 50 MG extended release tablet Take 50 mg by mouth daily         Patient   Does Use ASA, NSAID No  Allergies  No Known Allergies     Social   Social History     Tobacco Use    Smoking status: Former Smoker   Substance Use Topics    Alcohol use: Yes        PSYCH HISTORY:  Depression No  Anxiety No  Suicide No       History reviewed. No pertinent family history. No family history of colon cancer, Crohn's disease, or ulcerative colitis. Review of Systems  Constitutional: negative  Eyes: negative  Ears, nose, mouth, throat, and face: negative  Respiratory: negative  Cardiovascular: negative  Gastrointestinal: negative  Genitourinary:negative  Integument/breast: negative  Hematologic/lymphatic: negative  Musculoskeletal:negative  Endocrine: negative           Physical Exam  Blood pressure 134/80, pulse 104, temperature 97.3 °F (36.3 °C), temperature source Oral, resp. rate 20, height 6' (1.829 m), weight 250 lb (113.4 kg), SpO2 98 %.          General Appearance: alert and oriented to person, place and time, well-developed and well-nourished, in no acute distress  Skin: warm and dry, no rash or erythema  Head: normocephalic and atraumatic  Eyes: pupils equal, round, and reactive to light, extraocular eye movements intact, *    Anemia  Elevated LFTs with hepatomegaly  History of remote alcohol use  Lung nodules          Recommendations:   Anemia work-up and IV iron if needed  GI work-up we will need an EGD colonoscopy and or outpatient  We will hold [facility]  H&H monitoring  Liver diseases work-up  PPI                      Thank you for allowing me to participate in the care of your patient. Please feel free to contact me with any questions or concerns.      Felicita Parra MD

## 2021-10-26 NOTE — CONSULTS
Reason for consult: Longstanding persistent atrial fibrillation    History of present illness: The patient is a 58-year-old gentleman who is well-known to our practice. He has a history of longstanding persistent atrial fibrillation. He tells me he recently saw his primary care physician in the office. At that visit he stated that he explained he was having some pain with his right hip, right shoulder and that his \"atrial fibrillation was bothering him\". In reviewing the notes from the emergency department, apparently the patient was also noted to be pale with concern for anemia. He does tell me he has noted some increased palpitations over the past several weeks. Additionally during that timeframe he has had some increase in exertional dyspnea. He tells me that over the summer he was able to go for walks outside with his wife; however, he has not been able to do that over the past month. He has not had any chest discomfort. He has not noted any bleeding. He specifically denies epistaxis, hematemesis, hemoptysis, hematochezia and melena. He has no other acute complaints currently. Allergies: No known drug allergies    Medications: His home medical regimen consists of Ozempic, insulin, Metformin, Eliquis 2.5 mg twice daily, atorvastatin 10 mg daily, furosemide 20 mg daily, pioglitazone, losartan 50 mg daily, allopurinol, colchicine, metoprolol succinate 50 mg daily    Past medical history: Significant for longstanding persistent atrial fibrillation, hypertension, diabetes, dyslipidemia. Social history: He is a former smoker    Family history: Significant for heart disease in his father    Review of systems: He denies epistaxis, hematemesis, hemoptysis, hematochezia, melena, hematuria. All other systems are negative except as noted in history of present illness. Physical examination  Vitals: Blood pressure 115/72, heart rate 85, respiratory rate 18  HEENT: Normocephalic, atraumatic.   Pupils equal round react light. Oropharynx pink and moist.  Neck: Supple, no adenopathy  Cardiovascular: S1, S2. Irregularly irregular. No S3. No S4. There is a 2/6 holosystolic plateau murmur loudest at the apex. No rub. No jugular venous pressure elevation. No bruit. No edema. Respiratory: Vesicular breath sound  Abdomen: Soft  Extremity: No clubbing or cyanosis  Skin: Warm and dry  Neurologic: Alert and oriented    Labs: White blood cell count 7.1, hemoglobin 6.8, platelet count 191,291. Sodium 136, potassium 3.6, chloride 103, bicarb 20, BUN 34, creatinine 1.45. Pro BNP 3171. High-sensitivity cardiac troponin 58, 54 on serial measurements. Telemetry currently reveals atrial fibrillation. Impression  1. Longstanding persistent atrial fibrillation. Currently the heart rate is controlled. Anticoagulation is being held due to anemia. 2.  Hypertension. His blood pressure is currently controlled. 3.  Anemia. The source is uncertain. GI has been consulted. This is appropriate. Recommendations  1. Continue to hold Eliquis. 2.  Continue outpatient dose of metoprolol for rate control. 3.  At this point the patient may proceed with any additional procedures such as endoscopy or other procedures required to diagnose and/or treat anemia.

## 2021-10-26 NOTE — PLAN OF CARE
Nutrition Problem #1: Inadequate protein-energy intake  Intervention: Food and/or Nutrient Delivery: Continue Current Diet  Nutritional Goals: PO intakes to provide >75% of estimated nutritional needs

## 2021-10-26 NOTE — PLAN OF CARE
Problem: Activity Intolerance:  Goal: Ability to tolerate increased activity will improve  Description: Ability to tolerate increased activity will improve  Outcome: Ongoing     Problem: Cardiac Output - Decreased:  Goal: Hemodynamic stability will improve  Description: Hemodynamic stability will improve  Outcome: Ongoing     Problem: Fluid Volume - Excess:  Goal: Control of fluid volume excess will improve  Description: Control of fluid volume excess will improve  Outcome: Ongoing    Pt. Admitted with SOB and tachy.  New orders placed

## 2021-10-26 NOTE — CARE COORDINATION
Case Management Initial Discharge Plan  Gibran Hamptonl,         Readmission Risk              Risk of Unplanned Readmission:  15             Met with:patient to discuss discharge plans. Information verified: address, contacts, phone number, , insurance Yes  PCP: Yuliya Lira  Date of last visit: yesterday     Insurance Provider: medicare and Duffy Kanner     Discharge Planning  Current Residence:  Private home   Living Arrangements:  Spouse/Significant Other       Home has 2 stories/2 stairs to climb to enter the home. His bedroom and bathroom are on the main floor. Support Systems:  Spouse/Significant Other     Current Services PTA:  None    Agency: none     Patient able to perform ADL's:Independent  DME in home:  None   DME used to aid ambulation prior to admission:   None   DME used during admission:  None     Potential Assistance Needed:  N/A    Pharmacy: [facility] [address] [facility] [employer] [facility]. Potential Assistance Purchasing Medications:  No  Does patient want to participate in local refill/ meds to beds program?  No    Patient agreeable to home care: No  Wells of choice provided:  n/a      Type of Home Care Services:  RT  Patient expects to be discharged to:   home     Prior SNF/Rehab Placement and Facility: none   Agreeable to SNF/Rehab: No  Wells of choice provided: no   Evaluation: n/a    Expected Discharge date:  10/29/21  Follow Up Appointment: Best Day/ Time: Monday AM    Transportation provider: per wife   Transportation arrangements needed for discharge: No    Discharge Plan:   Met with patient to complete discharge assessment. Patient lives with wife. Independent and drives   Uses no dme    Eliquis is a home med for a fib currently on hold due to anemia. Cardiology is following. Ok to proceed with any testing if needed     Denies any need for skilled home care will continue to follow.        Electronically signed by Ronn Spencer RN on 10/26/21 at 11:57 AM EDT

## 2021-10-27 ENCOUNTER — APPOINTMENT (OUTPATIENT)
Dept: GENERAL RADIOLOGY | Age: 71
DRG: 291 | End: 2021-10-27
Payer: MEDICARE

## 2021-10-27 LAB
ANION GAP SERPL CALCULATED.3IONS-SCNC: 15 MMOL/L (ref 9–17)
ANTI DNA DOUBLE STRANDED: 0.7 IU/ML
ANTI-NUCLEAR ANTIBODY (ANA): NEGATIVE
BUN BLDV-MCNC: 42 MG/DL (ref 8–23)
BUN/CREAT BLD: 29 (ref 9–20)
CALCIUM SERPL-MCNC: 8.6 MG/DL (ref 8.6–10.4)
CHLORIDE BLD-SCNC: 102 MMOL/L (ref 98–107)
CHOLESTEROL/HDL RATIO: 2.8
CHOLESTEROL: 106 MG/DL
CMV IGM: 0.2
CO2: 20 MMOL/L (ref 20–31)
CREAT SERPL-MCNC: 1.46 MG/DL (ref 0.7–1.2)
CYTOMEGALOVIRUS IGG ANTIBODY: 0.2
ENA ANTIBODIES SCREEN: 0.2 U/ML
GFR AFRICAN AMERICAN: 58 ML/MIN
GFR NON-AFRICAN AMERICAN: 48 ML/MIN
GFR SERPL CREATININE-BSD FRML MDRD: ABNORMAL ML/MIN/{1.73_M2}
GFR SERPL CREATININE-BSD FRML MDRD: ABNORMAL ML/MIN/{1.73_M2}
GLUCOSE BLD-MCNC: 106 MG/DL (ref 75–110)
GLUCOSE BLD-MCNC: 160 MG/DL (ref 75–110)
GLUCOSE BLD-MCNC: 95 MG/DL (ref 70–99)
GLUCOSE BLD-MCNC: 95 MG/DL (ref 75–110)
GLUCOSE BLD-MCNC: 96 MG/DL (ref 75–110)
HCT VFR BLD CALC: 25.6 % (ref 40.7–50.3)
HDLC SERPL-MCNC: 38 MG/DL
HEMOGLOBIN: 7.7 G/DL (ref 13–17)
LDL CHOLESTEROL: 52 MG/DL (ref 0–130)
MAGNESIUM: 1.9 MG/DL (ref 1.6–2.6)
MCH RBC QN AUTO: 22.4 PG (ref 25.2–33.5)
MCHC RBC AUTO-ENTMCNC: 30.1 G/DL (ref 28.4–34.8)
MCV RBC AUTO: 74.4 FL (ref 82.6–102.9)
MITOCHONDRIAL ANTIBODY: 1.3 U/ML (ref 0–4)
NRBC AUTOMATED: 0.4 PER 100 WBC
PDW BLD-RTO: 20.8 % (ref 11.8–14.4)
PLATELET # BLD: 322 K/UL (ref 138–453)
PMV BLD AUTO: 9.5 FL (ref 8.1–13.5)
POTASSIUM SERPL-SCNC: 3.4 MMOL/L (ref 3.7–5.3)
RBC # BLD: 3.44 M/UL (ref 4.21–5.77)
SODIUM BLD-SCNC: 137 MMOL/L (ref 135–144)
TRIGL SERPL-MCNC: 79 MG/DL
TROPONIN INTERP: ABNORMAL
TROPONIN T: ABNORMAL NG/ML
TROPONIN, HIGH SENSITIVITY: 50 NG/L (ref 0–22)
VLDLC SERPL CALC-MCNC: ABNORMAL MG/DL (ref 1–30)
WBC # BLD: 6.7 K/UL (ref 3.5–11.3)

## 2021-10-27 PROCEDURE — 6360000002 HC RX W HCPCS: Performed by: FAMILY MEDICINE

## 2021-10-27 PROCEDURE — 99232 SBSQ HOSP IP/OBS MODERATE 35: CPT | Performed by: INTERNAL MEDICINE

## 2021-10-27 PROCEDURE — 83735 ASSAY OF MAGNESIUM: CPT

## 2021-10-27 PROCEDURE — 80061 LIPID PANEL: CPT

## 2021-10-27 PROCEDURE — 36415 COLL VENOUS BLD VENIPUNCTURE: CPT

## 2021-10-27 PROCEDURE — 82947 ASSAY GLUCOSE BLOOD QUANT: CPT

## 2021-10-27 PROCEDURE — 80048 BASIC METABOLIC PNL TOTAL CA: CPT

## 2021-10-27 PROCEDURE — 2580000003 HC RX 258: Performed by: FAMILY MEDICINE

## 2021-10-27 PROCEDURE — 6370000000 HC RX 637 (ALT 250 FOR IP): Performed by: FAMILY MEDICINE

## 2021-10-27 PROCEDURE — 84484 ASSAY OF TROPONIN QUANT: CPT

## 2021-10-27 PROCEDURE — 73501 X-RAY EXAM HIP UNI 1 VIEW: CPT

## 2021-10-27 PROCEDURE — APPSS30 APP SPLIT SHARED TIME 16-30 MINUTES: Performed by: NURSE PRACTITIONER

## 2021-10-27 PROCEDURE — 73020 X-RAY EXAM OF SHOULDER: CPT

## 2021-10-27 PROCEDURE — 2060000000 HC ICU INTERMEDIATE R&B

## 2021-10-27 PROCEDURE — 85027 COMPLETE CBC AUTOMATED: CPT

## 2021-10-27 PROCEDURE — 6360000002 HC RX W HCPCS: Performed by: NURSE PRACTITIONER

## 2021-10-27 PROCEDURE — C9113 INJ PANTOPRAZOLE SODIUM, VIA: HCPCS | Performed by: FAMILY MEDICINE

## 2021-10-27 PROCEDURE — 6370000000 HC RX 637 (ALT 250 FOR IP): Performed by: NURSE PRACTITIONER

## 2021-10-27 RX ORDER — ONDANSETRON 2 MG/ML
4 INJECTION INTRAMUSCULAR; INTRAVENOUS ONCE
Status: COMPLETED | OUTPATIENT
Start: 2021-10-27 | End: 2021-10-27

## 2021-10-27 RX ORDER — FUROSEMIDE 10 MG/ML
20 INJECTION INTRAMUSCULAR; INTRAVENOUS 2 TIMES DAILY
Status: DISCONTINUED | OUTPATIENT
Start: 2021-10-27 | End: 2021-10-30 | Stop reason: HOSPADM

## 2021-10-27 RX ADMIN — ALLOPURINOL 100 MG: 100 TABLET ORAL at 09:23

## 2021-10-27 RX ADMIN — IRON SUCROSE 200 MG: 20 INJECTION, SOLUTION INTRAVENOUS at 10:36

## 2021-10-27 RX ADMIN — ATORVASTATIN CALCIUM 10 MG: 10 TABLET, FILM COATED ORAL at 09:24

## 2021-10-27 RX ADMIN — METOPROLOL SUCCINATE 50 MG: 50 TABLET, EXTENDED RELEASE ORAL at 09:23

## 2021-10-27 RX ADMIN — SODIUM CHLORIDE, PRESERVATIVE FREE 10 ML: 5 INJECTION INTRAVENOUS at 09:24

## 2021-10-27 RX ADMIN — POTASSIUM CHLORIDE 40 MEQ: 20 TABLET, EXTENDED RELEASE ORAL at 09:23

## 2021-10-27 RX ADMIN — BISACODYL 10 MG: 5 TABLET, COATED ORAL at 18:31

## 2021-10-27 RX ADMIN — FUROSEMIDE 20 MG: 10 INJECTION, SOLUTION INTRAMUSCULAR; INTRAVENOUS at 09:24

## 2021-10-27 RX ADMIN — POLYETHYLENE GLYCOL-3350 AND ELECTROLYTES 4000 ML: 236; 6.74; 5.86; 2.97; 22.74 POWDER, FOR SOLUTION ORAL at 18:31

## 2021-10-27 RX ADMIN — FUROSEMIDE 20 MG: 10 INJECTION, SOLUTION INTRAMUSCULAR; INTRAVENOUS at 18:31

## 2021-10-27 RX ADMIN — ONDANSETRON 4 MG: 2 INJECTION INTRAMUSCULAR; INTRAVENOUS at 18:31

## 2021-10-27 RX ADMIN — SODIUM CHLORIDE, PRESERVATIVE FREE 10 ML: 5 INJECTION INTRAVENOUS at 20:33

## 2021-10-27 RX ADMIN — PANTOPRAZOLE SODIUM 40 MG: 40 INJECTION, POWDER, FOR SOLUTION INTRAVENOUS at 09:23

## 2021-10-27 ASSESSMENT — PAIN SCALES - GENERAL: PAINLEVEL_OUTOF10: 0

## 2021-10-27 NOTE — PROGRESS NOTES
Subjective: The patient reports he feels well today. He denies chest discomfort. He denies dyspnea. He denies palpitations. He tells me that he is going to be having endoscopy tomorrow. Objective  Vitals: Blood pressure 123/85, heart rate 94, rest rate 16  Cardiovascular: S1, S2. Irregularly irregular. Respiratory: Vesicular breath sound  Abdomen: Soft. Labs: Sodium 137, potassium 3.4, chloride 102, bicarb 20, BUN 42, creatinine 1.46. White blood cell count 6.7, hemoglobin 7.7, platelet count 938,144. Impression  1. Longstanding persistent atrial fibrillation. Currently his heart rate control is reasonable. Eliquis is currently being held due to anemia and plans for endoscopy. Recommendations  1. Continue current medications. 2.  We will await results of endoscopy regarding resumption of anticoagulation. Depending upon the results, the patient may need to be evaluated for a watchman procedure as an outpatient.

## 2021-10-27 NOTE — PROGRESS NOTES
Patient has had both COVID vaccines  Per policy patient does not need to be swabbed for EGD/colonscopy 10/28

## 2021-10-27 NOTE — PROGRESS NOTES
[facility]    Gastroenterology Daily Progress Note      Patient:   Edgar Mejia   :    1950   Facility:   [facility]  Date:     10/27/2021  Consultant:   ROSA Chacon CNP, CNP      SUBJECTIVE  69 y.o. male admitted 10/25/2021 with Pleural effusion [J90]  Other ascites [R18.8]  Decompensated heart failure ([address] [I50.9] and seen for anemia and elevated lft's. The pt was seen and examined. hgb 7.7, no BM or abdominal pain. Receiving iv iron. Liver w/u ongoing.         OBJECTIVE  Scheduled Meds:   furosemide  20 mg IntraVENous BID    iron sucrose  200 mg IntraVENous Q24H    pantoprazole  40 mg IntraVENous Daily    sodium chloride flush  5-40 mL IntraVENous 2 times per day    [Held by provider] insulin glargine  30 Units SubCUTAneous Nightly    insulin lispro  0-12 Units SubCUTAneous TID WC    atorvastatin  10 mg Oral Daily    losartan  50 mg Oral Daily    metoprolol succinate  50 mg Oral Daily    allopurinol  100 mg Oral Daily    [Held by provider] apixaban  2.5 mg Oral BID       Vital Signs:  /69   Pulse 91   Temp 98.4 °F (36.9 °C)   Resp 16   Ht 6' (1.829 m)   Wt 242 lb 7 oz (110 kg)   SpO2 98%   BMI 32.88 kg/m²      Physical Exam:   General Appearance: alert and oriented to person, place and time, well-developed and well-nourished, in no acute distress  Skin: warm and dry, no rash or erythema  Head: normocephalic and atraumatic  Eyes: pupils equal, round, and reactive to light, extraocular eye movements intact, conjunctivae normal  ENT: hearing grossly normal bilaterally  Neck: neck supple and non tender without mass, no thyromegaly or thyroid nodules, no cervical lymphadenopathy   Pulmonary/Chest: clear to auscultation bilaterally- no wheezes, rales or rhonchi, normal air movement, no respiratory distress  Cardiovascular: normal rate, regular rhythm, normal S1 and S2, no murmurs, rubs, clicks or gallops, distal pulses intact, no carotid bruits  Abdomen: soft, obese non-tender, non-distended, normal bowel sounds, no masses or organomegaly  Extremities: no cyanosis, clubbing or edema  Musculoskeletal: normal range of motion, no joint swelling, deformity or tenderness  Neurologic: no cranial nerve deficit and muscle strength normal    Lab and Imaging Review     CBC  Recent Labs     10/25/21  2051 10/25/21  2051 10/26/21  0508 10/26/21  1546 10/27/21  0554   WBC 8.6  --  7.1  --  6.7   HGB 7.9*   < > 6.8* 8.1* 7.7*   HCT 26.7*   < > 23.2* 27.8* 25.6*   MCV 73.8*  --  72.5*  --  74.4*     --  310  --  322    < > = values in this interval not displayed. BMP  Recent Labs     10/25/21  2051 10/26/21  0508 10/27/21  0554   * 136 137   K 4.3 3.6* 3.4*    103 102   CO2 19* 20 20   BUN 53* 49* 42*   CREATININE 1.65* 1.45* 1.46*   GLUCOSE 217* 143* 95   CALCIUM 8.9 8.5* 8.6       LFTS  Recent Labs     10/26/21  0508   ALKPHOS 139*   ALT 46*   AST 57*   PROT 6.8   BILITOT 0.73   BILIDIR 0.40*   LABALBU 3.5         PT/INR  Recent Labs     10/25/21  2051   PROTIME 19.2*   INR 1.7         ANEMIA STUDIES  Recent Labs     10/26/21  0508 10/26/21  1546   LABIRON 5*  --    TIBC 339  --    FERRITIN 51  --    FOLATE  --  14.4   Results for [place] [facility] (MRN [id number]) as of 10/27/2021 14:51   Ref. Range 10/26/2021 15:46   A-1 Antitrypsin Latest Ref Range: 90 - 200 mg/dL 248 (H)   Results for [place] [facility] (MRN [id number]) as of 10/27/2021 14:51   Ref. Range 10/26/2021 15:46   Ceruloplasmin Latest Ref Range: 15 - 30 mg/dL 41 (H)   Results for [place] [facility] (MRN [id number]) as of 10/27/2021 14:51   Ref. Range 10/26/2021 15:46   MADHAVI Latest Ref Range: NEGATIVE  NEGATIVE   Anti ds DNA Latest Ref Range: <10.0 IU/mL 0.7   Anti-Mitochondrial Antibody Latest Ref Range: 0.0 - 4.0 U/mL 1.3   Results for [facility] (MRN [id number]) as of 10/27/2021 14:51   Ref.  Range 10/26/2021 15:46   IgA Latest Ref Range: 70 - 400 mg/dL 453 (H)   IgM Latest Ref Range: 40 - 230 mg/dL 39 (L)   Results for Malinda Tobin (MRN [id number]) as of 10/27/2021 14:51   Ref. Range 10/26/2021 15:46   Hep A IgM Latest Ref Range: NONREACTIVE  NONREACTIVE   Hepatitis B Surface Ag Latest Ref Range: NONREACTIVE  NONREACTIVE   Hepatitis C Ab Latest Ref Range: NONREACTIVE  NONREACTIVE   Hep B Core Ab, IgM Latest Ref Range: NONREACTIVE  NONREACTIVE     FINDINGS:10/25/21ct       Chest:       Mediastinum: Evaluation is limited by the absence of contrast.  Mediastinal   fat is normal in attenuation.  No adenopathy.  Subcentimeter hypodense nodule   in the left thyroid lobe which warrants no specific follow-up. [place] is   grossly normal caliber with mild-to-moderate atherosclerotic calcification. Main pulmonary artery is mildly enlarged at 3.5 cm.  Cardiomegaly due to   biatrial enlargement.  3 vessel coronary artery calcifications.  Hypodense   intravascular blood pool.  Pericardial effusion.       Lungs/pleura: Small left greater than right water attenuation pleural   effusions.  Linear bands of atelectasis with some small patchy areas of   consolidation in the lung bases.  9 mm solid noncalcified nodule with   somewhat spiculated margins in the posterior segment right upper lobe (series   6, image 60).  Similar appearing 7 mm solid noncalcified nodule in the   lateral segment right middle lobe (series 6, image 81).  8 mm solid   noncalcified nodule in lateral basal left lower lobe (series 6, image 75).   A   few scattered calcified granulomata from prior granulomatous infection.  No   pneumothorax.  Central airways are patent with minimal background bronchial   wall thickening.       Soft Tissues/Bones:           Abdomen/Pelvis:       Organs: Evaluation is limited by the absence of contrast.  Mild hepatomegaly   spanning 19.2 cm craniocaudal.  Suggestion of a minimally nodular peripheral   hepatic contour.  Mild pericholecystic inflammatory change.  Spleen is normal   in size and attenuation.  Pancreas is atrophic.  Adrenal glands are within   normal limits.  No nephrolithiasis or hydronephrosis.  Vascular calcification   in the right renal sinus.       GI/Bowel: Stomach is mildly distended with gas and fluid.  Duodenum is normal   caliber as is the remainder of the small bowel.  Normal appendix.  No colonic   obstruction. There is diverticulosis without evidence of diverticulitis.       Pelvis: Urinary bladder and male pelvic organs are grossly unremarkable.       Peritoneum/Retroperitoneum: Small volume simple ascites.  No free air. Graying of the central mesenteric fat with normal caliber intervening lymph   nodes.  Aortoiliac atherosclerosis without aneurysm.       Bones/Soft Tissues: Mild anasarca.  Advanced degenerative changes of the   spine and hips.  Grade 1 degenerative anterolisthesis L4 on L5 with   associated moderate to severe spinal canal and severe bilateral   neuroforaminal stenosis.         Impression   Bilateral left greater than right layering pleural effusions with adjacent   atelectasis.  Small scattered areas of consolidation in the lung bases may be   due to superimposed infection in the appropriate clinical setting.       Solid noncalcified nodules in the right upper, right middle, and left lower   lobes.  See recommendation below.       Cardiomegaly due to biatrial enlargement.  Pericardial effusion.       Hypodense intravascular blood pool which can be seen in the setting of   anemia.  Correlate clinically.       Hepatomegaly with suggestion of a slightly micronodular hepatic contour.    Query possible underlying cirrhosis.       Gallbladder wall thickening which may relate to underlying systemic cause   such as the patient's cardiac or suspected hepatic disease, though clinical   correlation is required to exclude acute cholecystitis.       Colonic diverticulosis without evidence of diverticulitis.       Small volume ascites, graying of the mesenteric fat, and anasarca.       Grade 1 degenerative anterolisthesis L4 on L5 with moderate to severe spinal   canal and severe bilateral neuroforaminal stenosis.             ASSESSMENT/plan  1. Anemia, iron deficiency  -continue iv iron replacement  - egd and colonoscopy tomorrow  -clear diet today  -trend hh and keep hgb >7  -continue ppi  -hold ac0    2.elevated lft's  Liver w/u on going      This plan was formulated in collaboration with  . Electronically signed by: ROSA Farias - CNP on 10/27/2021 at 2:51 PM     Attending Physician Statement  I have discussed the care of Juan Alejandro and   I have examined the patient myselft independently, and taken ros and hpi , including pertinent history and exam findings,  with the author of this note . I have reviewed the key elements of all parts of the encounter with the nurse practitioner/resident.     I agree with the assessment, plan and orders as documented by the above health care provider       EGD colonoscopy tomorrow   electronically signed by Lashay Barnes MD

## 2021-10-27 NOTE — PLAN OF CARE
Problem: Discharge Planning:  Goal: Discharged to appropriate level of care  Description: Discharged to appropriate level of care  Outcome: Ongoing     Problem: Activity Intolerance:  Goal: Ability to tolerate increased activity will improve  Description: Ability to tolerate increased activity will improve  Outcome: Ongoing     Problem: Cardiac Output - Decreased:  Goal: Hemodynamic stability will improve  Description: Hemodynamic stability will improve  Outcome: Ongoing     Problem: Fluid Volume - Excess:  Goal: Control of fluid volume excess will improve  Description: Control of fluid volume excess will improve  10/27/2021 0940 by Andra Ardon RN  Outcome: Ongoing  10/26/2021 2338 by Clarita Snellen, RN  Outcome: Ongoing     Problem: Gas Exchange - Impaired:  Goal: Levels of oxygenation will improve  Description: Levels of oxygenation will improve  Outcome: Ongoing     Problem: Mood - Altered:  Goal: Mood stable  Description: Mood stable  Outcome: Ongoing     Problem: Tissue Perfusion - Cardiopulmonary, Altered:  Goal: Absence of angina  Description: Absence of angina  Outcome: Ongoing  Goal: Circulation will improve to fullest extent possible  Description: Circulation will improve to fullest extent possible  Outcome: Ongoing  Goal: Hemodynamic stability will improve  Description: Hemodynamic stability will improve  Outcome: Ongoing     Problem: Tobacco Use:  Goal: Will participate in inpatient tobacco-use cessation counseling  Description: Will participate in inpatient tobacco-use cessation counseling  Outcome: Ongoing     Problem: Venous Thromboembolism:  Goal: Will show no signs or symptoms of venous thromboembolism  Description: Will show no signs or symptoms of venous thromboembolism  Outcome: Ongoing  Goal: Absence of signs or symptoms of impaired coagulation  Description: Absence of signs or symptoms of impaired coagulation  Outcome: Ongoing     Problem:  Bowel Function - Altered:  Goal: Bowel elimination is

## 2021-10-27 NOTE — PROGRESS NOTES
PROGRESS NOTE    Admit Date: 10/25/2021         Subjective: admitted with severe anemia , sob and decompensated chf without chest pain ,hx of at fib on eliquis      Diet: ADULT DIET; Regular; 4 carb choices (60 gm/meal); Low Fat/Low Chol/High Fiber/KARIN; Low Sodium (2 gm); 1800 ml  Pain is:None  Nausea:None  Bowel Movement/Flatus yes    Data:   Scheduled Meds: Reviewed  Continuous Infusions:   sodium chloride      dextrose      sodium chloride         Intake/Output Summary (Last 24 hours) at 10/27/2021 0818  Last data filed at 10/27/2021 3526  Gross per 24 hour   Intake 200 ml   Output 800 ml   Net -600 ml     Hematology:  Recent Labs     10/25/21  2051 10/25/21  2051 10/26/21  0508 10/26/21  1546 10/27/21  0554   WBC 8.6  --  7.1  --  6.7   HGB 7.9*   < > 6.8* 8.1* 7.7*   HCT 26.7*   < > 23.2* 27.8* 25.6*     --  310  --  322   INR 1.7  --   --   --   --     < > = values in this interval not displayed. Chemistry:  Recent Labs     10/25/21  2051 10/26/21  0508 10/27/21  0554   * 136 137   K 4.3 3.6* 3.4*    103 102   CO2 19* 20 20   GLUCOSE 217* 143* 95   BUN 53* 49* 42*   CREATININE 1.65* 1.45* 1.46*   MG 2.1 2.0 1.9   ANIONGAP 15 13 15   LABGLOM 41* 48* 48*   GFRAA 50* 58* 58*   CALCIUM 8.9 8.5* 8.6     Recent Labs     10/26/21  0508   PROT 6.8   LABALBU 3.5   LABA1C 6.5*   AST 57*   ALT 46*   ALKPHOS 139*   BILITOT 0.73   BILIDIR 0.40*       -----------------------------------------------------------------  RAD:     Objective:   Vitals: /82   Pulse 94   Temp 98.1 °F (36.7 °C) (Oral)   Resp 20   Ht 6' (1.829 m)   Wt 242 lb 7 oz (110 kg)   SpO2 98%   BMI 32.88 kg/m²   General appearance: alert, appears stated age and cooperative  Skin: Skin color, texture, turgor normal.   HEENT: Head: Normocephalic, no lesions, without obvious abnormality.   Neck: no adenopathy, no carotid bruit, no JVD, supple, symmetrical, trachea midline and thyroid not enlarged, symmetric, no tenderness/mass/nodules  Lungs: diminished breath sounds bibasilar and bilaterally and rhonchi anterior - bilateral  Heart: irregularly irregular rhythm and S1, S2 normal  Abdomen: soft, non-tender; bowel sounds normal; no masses,  no organomegaly  Extremities: Homans sign is negative, no sign of DVT  Lymphatic: No significant lymph node enlargement papable  Neurologic: Mental status: Alert, oriented, thought content appropriate      Assessment & Plan:    Patient Active Problem List:     Decompensated heart failure (HCC)     Anemia, blood loss     Elevated LFTs     Acute renal insufficience      Atrial fibrillation       Possible cirrhosis          See orders   Disposition: iv lasix    fluid restriction   Iv ppi   moniter I and O  Iv iron after present transfusion   Cardiology consult   Echo , tropnonin pending   Xray shoulder and hip when stable  edg /colonscopy     Jose Manuel Velez

## 2021-10-27 NOTE — PLAN OF CARE
Problem: Discharge Planning:  Goal: Discharged to appropriate level of care  Description: Discharged to appropriate level of care  10/26/2021 1228 by Andra Ardon RN  Outcome: Ongoing     Problem: Fluid Volume - Excess:  Goal: Control of fluid volume excess will improve  Description: Control of fluid volume excess will improve  10/26/2021 2338 by Clarita Snellen, RN  Outcome: Ongoing  10/26/2021 1228 by Andra Ardon RN  Outcome: Ongoing    Lasix has decreased the swelling in his lower extremities considerable.  Weight ws three ponds less today than yesterday

## 2021-10-28 ENCOUNTER — ANESTHESIA (OUTPATIENT)
Dept: OPERATING ROOM | Age: 71
DRG: 291 | End: 2021-10-28
Payer: MEDICARE

## 2021-10-28 ENCOUNTER — APPOINTMENT (OUTPATIENT)
Dept: GENERAL RADIOLOGY | Age: 71
DRG: 291 | End: 2021-10-28
Payer: MEDICARE

## 2021-10-28 ENCOUNTER — ANESTHESIA EVENT (OUTPATIENT)
Dept: OPERATING ROOM | Age: 71
DRG: 291 | End: 2021-10-28
Payer: MEDICARE

## 2021-10-28 VITALS — DIASTOLIC BLOOD PRESSURE: 57 MMHG | SYSTOLIC BLOOD PRESSURE: 75 MMHG | OXYGEN SATURATION: 99 %

## 2021-10-28 LAB
ALBUMIN SERPL-MCNC: 3.6 G/DL (ref 3.5–5.2)
ALBUMIN/GLOBULIN RATIO: ABNORMAL (ref 1–2.5)
ALP BLD-CCNC: 137 U/L (ref 40–129)
ALT SERPL-CCNC: 43 U/L (ref 5–41)
ANION GAP SERPL CALCULATED.3IONS-SCNC: 13 MMOL/L (ref 9–17)
AST SERPL-CCNC: 48 U/L
BILIRUB SERPL-MCNC: 1.11 MG/DL (ref 0.3–1.2)
BILIRUBIN DIRECT: 0.43 MG/DL
BILIRUBIN, INDIRECT: 0.68 MG/DL (ref 0–1)
BUN BLDV-MCNC: 31 MG/DL (ref 8–23)
BUN/CREAT BLD: 26 (ref 9–20)
CALCIUM SERPL-MCNC: 8.5 MG/DL (ref 8.6–10.4)
CHLORIDE BLD-SCNC: 98 MMOL/L (ref 98–107)
CO2: 25 MMOL/L (ref 20–31)
CREAT SERPL-MCNC: 1.2 MG/DL (ref 0.7–1.2)
EBV EARLY ANTIGEN IGG: 20 U/ML
EBV INTERPRETATION: ABNORMAL
EBV NUCLEAR AG AB: 206 U/ML
EPSTEIN-BARR VCA IGG: 1853 U/ML
EPSTEIN-BARR VCA IGM: 47 U/ML
GFR AFRICAN AMERICAN: >60 ML/MIN
GFR NON-AFRICAN AMERICAN: 60 ML/MIN
GFR SERPL CREATININE-BSD FRML MDRD: ABNORMAL ML/MIN/{1.73_M2}
GFR SERPL CREATININE-BSD FRML MDRD: ABNORMAL ML/MIN/{1.73_M2}
GLOBULIN: ABNORMAL G/DL (ref 1.5–3.8)
GLUCOSE BLD-MCNC: 100 MG/DL (ref 75–110)
GLUCOSE BLD-MCNC: 212 MG/DL (ref 75–110)
GLUCOSE BLD-MCNC: 66 MG/DL (ref 70–99)
GLUCOSE BLD-MCNC: 75 MG/DL (ref 75–110)
GLUCOSE BLD-MCNC: 76 MG/DL (ref 75–110)
GLUCOSE BLD-MCNC: 85 MG/DL (ref 75–110)
HCT VFR BLD CALC: 25.5 % (ref 40.7–50.3)
HEMOGLOBIN: 7.6 G/DL (ref 13–17)
LIVER-KIDNEY MICROSOMAL AB: NORMAL
MAGNESIUM: 1.7 MG/DL (ref 1.6–2.6)
POTASSIUM SERPL-SCNC: 3.7 MMOL/L (ref 3.7–5.3)
SODIUM BLD-SCNC: 136 MMOL/L (ref 135–144)
TOTAL PROTEIN: 6.4 G/DL (ref 6.4–8.3)

## 2021-10-28 PROCEDURE — 0DBL8ZZ EXCISION OF TRANSVERSE COLON, VIA NATURAL OR ARTIFICIAL OPENING ENDOSCOPIC: ICD-10-PCS | Performed by: INTERNAL MEDICINE

## 2021-10-28 PROCEDURE — 45380 COLONOSCOPY AND BIOPSY: CPT | Performed by: INTERNAL MEDICINE

## 2021-10-28 PROCEDURE — 0DB78ZX EXCISION OF STOMACH, PYLORUS, VIA NATURAL OR ARTIFICIAL OPENING ENDOSCOPIC, DIAGNOSTIC: ICD-10-PCS | Performed by: INTERNAL MEDICINE

## 2021-10-28 PROCEDURE — 2580000003 HC RX 258: Performed by: FAMILY MEDICINE

## 2021-10-28 PROCEDURE — 43239 EGD BIOPSY SINGLE/MULTIPLE: CPT | Performed by: INTERNAL MEDICINE

## 2021-10-28 PROCEDURE — 7100000010 HC PHASE II RECOVERY - FIRST 15 MIN: Performed by: INTERNAL MEDICINE

## 2021-10-28 PROCEDURE — 2709999900 HC NON-CHARGEABLE SUPPLY: Performed by: INTERNAL MEDICINE

## 2021-10-28 PROCEDURE — 0DB98ZX EXCISION OF DUODENUM, VIA NATURAL OR ARTIFICIAL OPENING ENDOSCOPIC, DIAGNOSTIC: ICD-10-PCS | Performed by: INTERNAL MEDICINE

## 2021-10-28 PROCEDURE — 0DB68ZX EXCISION OF STOMACH, VIA NATURAL OR ARTIFICIAL OPENING ENDOSCOPIC, DIAGNOSTIC: ICD-10-PCS | Performed by: INTERNAL MEDICINE

## 2021-10-28 PROCEDURE — 6360000002 HC RX W HCPCS: Performed by: NURSE ANESTHETIST, CERTIFIED REGISTERED

## 2021-10-28 PROCEDURE — 71045 X-RAY EXAM CHEST 1 VIEW: CPT

## 2021-10-28 PROCEDURE — 3609010400 HC COLONOSCOPY POLYPECTOMY HOT BIOPSY: Performed by: INTERNAL MEDICINE

## 2021-10-28 PROCEDURE — 3700000000 HC ANESTHESIA ATTENDED CARE: Performed by: INTERNAL MEDICINE

## 2021-10-28 PROCEDURE — 80048 BASIC METABOLIC PNL TOTAL CA: CPT

## 2021-10-28 PROCEDURE — 7100000011 HC PHASE II RECOVERY - ADDTL 15 MIN: Performed by: INTERNAL MEDICINE

## 2021-10-28 PROCEDURE — 36415 COLL VENOUS BLD VENIPUNCTURE: CPT

## 2021-10-28 PROCEDURE — C9113 INJ PANTOPRAZOLE SODIUM, VIA: HCPCS | Performed by: FAMILY MEDICINE

## 2021-10-28 PROCEDURE — 6360000002 HC RX W HCPCS: Performed by: INTERNAL MEDICINE

## 2021-10-28 PROCEDURE — 0DBN8ZZ EXCISION OF SIGMOID COLON, VIA NATURAL OR ARTIFICIAL OPENING ENDOSCOPIC: ICD-10-PCS | Performed by: INTERNAL MEDICINE

## 2021-10-28 PROCEDURE — 83735 ASSAY OF MAGNESIUM: CPT

## 2021-10-28 PROCEDURE — 88342 IMHCHEM/IMCYTCHM 1ST ANTB: CPT

## 2021-10-28 PROCEDURE — 88313 SPECIAL STAINS GROUP 2: CPT

## 2021-10-28 PROCEDURE — 6370000000 HC RX 637 (ALT 250 FOR IP): Performed by: INTERNAL MEDICINE

## 2021-10-28 PROCEDURE — 6360000002 HC RX W HCPCS: Performed by: FAMILY MEDICINE

## 2021-10-28 PROCEDURE — 3609012400 HC EGD TRANSORAL BIOPSY SINGLE/MULTIPLE: Performed by: INTERNAL MEDICINE

## 2021-10-28 PROCEDURE — 3700000001 HC ADD 15 MINUTES (ANESTHESIA): Performed by: INTERNAL MEDICINE

## 2021-10-28 PROCEDURE — 2500000003 HC RX 250 WO HCPCS: Performed by: NURSE ANESTHETIST, CERTIFIED REGISTERED

## 2021-10-28 PROCEDURE — 80076 HEPATIC FUNCTION PANEL: CPT

## 2021-10-28 PROCEDURE — 0DB58ZX EXCISION OF ESOPHAGUS, VIA NATURAL OR ARTIFICIAL OPENING ENDOSCOPIC, DIAGNOSTIC: ICD-10-PCS | Performed by: INTERNAL MEDICINE

## 2021-10-28 PROCEDURE — 2580000003 HC RX 258: Performed by: NURSE ANESTHETIST, CERTIFIED REGISTERED

## 2021-10-28 PROCEDURE — 94762 N-INVAS EAR/PLS OXIMTRY CONT: CPT

## 2021-10-28 PROCEDURE — 85018 HEMOGLOBIN: CPT

## 2021-10-28 PROCEDURE — 88305 TISSUE EXAM BY PATHOLOGIST: CPT

## 2021-10-28 PROCEDURE — 85014 HEMATOCRIT: CPT

## 2021-10-28 PROCEDURE — 2060000000 HC ICU INTERMEDIATE R&B

## 2021-10-28 PROCEDURE — 6370000000 HC RX 637 (ALT 250 FOR IP): Performed by: FAMILY MEDICINE

## 2021-10-28 PROCEDURE — 2580000003 HC RX 258: Performed by: INTERNAL MEDICINE

## 2021-10-28 PROCEDURE — 82947 ASSAY GLUCOSE BLOOD QUANT: CPT

## 2021-10-28 PROCEDURE — 45385 COLONOSCOPY W/LESION REMOVAL: CPT | Performed by: INTERNAL MEDICINE

## 2021-10-28 RX ORDER — SODIUM CHLORIDE, SODIUM LACTATE, POTASSIUM CHLORIDE, CALCIUM CHLORIDE 600; 310; 30; 20 MG/100ML; MG/100ML; MG/100ML; MG/100ML
INJECTION, SOLUTION INTRAVENOUS CONTINUOUS PRN
Status: DISCONTINUED | OUTPATIENT
Start: 2021-10-28 | End: 2021-10-28 | Stop reason: SDUPTHER

## 2021-10-28 RX ORDER — PROPOFOL 10 MG/ML
INJECTION, EMULSION INTRAVENOUS PRN
Status: DISCONTINUED | OUTPATIENT
Start: 2021-10-28 | End: 2021-10-28 | Stop reason: SDUPTHER

## 2021-10-28 RX ORDER — SPIRONOLACTONE 25 MG/1
25 TABLET ORAL DAILY
Status: DISCONTINUED | OUTPATIENT
Start: 2021-10-28 | End: 2021-10-30 | Stop reason: HOSPADM

## 2021-10-28 RX ORDER — LIDOCAINE HYDROCHLORIDE 20 MG/ML
INJECTION, SOLUTION EPIDURAL; INFILTRATION; INTRACAUDAL; PERINEURAL PRN
Status: DISCONTINUED | OUTPATIENT
Start: 2021-10-28 | End: 2021-10-28 | Stop reason: SDUPTHER

## 2021-10-28 RX ORDER — PHENYLEPHRINE HCL IN 0.9% NACL 1 MG/10 ML
SYRINGE (ML) INTRAVENOUS PRN
Status: DISCONTINUED | OUTPATIENT
Start: 2021-10-28 | End: 2021-10-28 | Stop reason: SDUPTHER

## 2021-10-28 RX ORDER — ONDANSETRON 2 MG/ML
4 INJECTION INTRAMUSCULAR; INTRAVENOUS
Status: DISCONTINUED | OUTPATIENT
Start: 2021-10-28 | End: 2021-10-28 | Stop reason: HOSPADM

## 2021-10-28 RX ADMIN — PROPOFOL 30 MG: 10 INJECTION, EMULSION INTRAVENOUS at 16:12

## 2021-10-28 RX ADMIN — ALLOPURINOL 100 MG: 100 TABLET ORAL at 08:15

## 2021-10-28 RX ADMIN — PROPOFOL 30 MG: 10 INJECTION, EMULSION INTRAVENOUS at 16:04

## 2021-10-28 RX ADMIN — PROPOFOL 30 MG: 10 INJECTION, EMULSION INTRAVENOUS at 16:02

## 2021-10-28 RX ADMIN — PROPOFOL 30 MG: 10 INJECTION, EMULSION INTRAVENOUS at 16:06

## 2021-10-28 RX ADMIN — SODIUM CHLORIDE, PRESERVATIVE FREE 10 ML: 5 INJECTION INTRAVENOUS at 20:36

## 2021-10-28 RX ADMIN — SPIRONOLACTONE 25 MG: 25 TABLET ORAL at 20:36

## 2021-10-28 RX ADMIN — PROPOFOL 40 MG: 10 INJECTION, EMULSION INTRAVENOUS at 16:14

## 2021-10-28 RX ADMIN — PROPOFOL 30 MG: 10 INJECTION, EMULSION INTRAVENOUS at 16:16

## 2021-10-28 RX ADMIN — SODIUM CHLORIDE, POTASSIUM CHLORIDE, SODIUM LACTATE AND CALCIUM CHLORIDE: 600; 310; 30; 20 INJECTION, SOLUTION INTRAVENOUS at 16:22

## 2021-10-28 RX ADMIN — FUROSEMIDE 20 MG: 10 INJECTION, SOLUTION INTRAMUSCULAR; INTRAVENOUS at 18:52

## 2021-10-28 RX ADMIN — PROPOFOL 50 MG: 10 INJECTION, EMULSION INTRAVENOUS at 15:52

## 2021-10-28 RX ADMIN — PROPOFOL 30 MG: 10 INJECTION, EMULSION INTRAVENOUS at 16:20

## 2021-10-28 RX ADMIN — PROPOFOL 30 MG: 10 INJECTION, EMULSION INTRAVENOUS at 16:00

## 2021-10-28 RX ADMIN — Medication 100 MCG: at 16:19

## 2021-10-28 RX ADMIN — PANTOPRAZOLE SODIUM 40 MG: 40 INJECTION, POWDER, FOR SOLUTION INTRAVENOUS at 08:16

## 2021-10-28 RX ADMIN — SODIUM CHLORIDE, POTASSIUM CHLORIDE, SODIUM LACTATE AND CALCIUM CHLORIDE: 600; 310; 30; 20 INJECTION, SOLUTION INTRAVENOUS at 15:47

## 2021-10-28 RX ADMIN — SODIUM CHLORIDE, PRESERVATIVE FREE 10 ML: 5 INJECTION INTRAVENOUS at 08:16

## 2021-10-28 RX ADMIN — PROPOFOL 40 MG: 10 INJECTION, EMULSION INTRAVENOUS at 16:10

## 2021-10-28 RX ADMIN — IRON SUCROSE 200 MG: 20 INJECTION, SOLUTION INTRAVENOUS at 08:15

## 2021-10-28 RX ADMIN — PROPOFOL 40 MG: 10 INJECTION, EMULSION INTRAVENOUS at 16:08

## 2021-10-28 RX ADMIN — PROPOFOL 30 MG: 10 INJECTION, EMULSION INTRAVENOUS at 15:54

## 2021-10-28 RX ADMIN — FUROSEMIDE 20 MG: 10 INJECTION, SOLUTION INTRAMUSCULAR; INTRAVENOUS at 08:16

## 2021-10-28 RX ADMIN — LIDOCAINE HYDROCHLORIDE 100 MG: 20 INJECTION, SOLUTION EPIDURAL; INFILTRATION; INTRACAUDAL; PERINEURAL at 15:52

## 2021-10-28 RX ADMIN — ATORVASTATIN CALCIUM 10 MG: 10 TABLET, FILM COATED ORAL at 08:16

## 2021-10-28 RX ADMIN — PROPOFOL 30 MG: 10 INJECTION, EMULSION INTRAVENOUS at 16:18

## 2021-10-28 RX ADMIN — LOSARTAN POTASSIUM 50 MG: 50 TABLET, FILM COATED ORAL at 08:16

## 2021-10-28 RX ADMIN — PROPOFOL 30 MG: 10 INJECTION, EMULSION INTRAVENOUS at 15:56

## 2021-10-28 RX ADMIN — PROPOFOL 30 MG: 10 INJECTION, EMULSION INTRAVENOUS at 15:58

## 2021-10-28 RX ADMIN — METOPROLOL SUCCINATE 50 MG: 50 TABLET, EXTENDED RELEASE ORAL at 08:16

## 2021-10-28 ASSESSMENT — PULMONARY FUNCTION TESTS
PIF_VALUE: 0
PIF_VALUE: 1
PIF_VALUE: 1
PIF_VALUE: 0
PIF_VALUE: 1
PIF_VALUE: 1
PIF_VALUE: 0
PIF_VALUE: 1
PIF_VALUE: 0

## 2021-10-28 ASSESSMENT — PAIN SCALES - GENERAL
PAINLEVEL_OUTOF10: 0

## 2021-10-28 ASSESSMENT — ENCOUNTER SYMPTOMS: SHORTNESS OF BREATH: 0

## 2021-10-28 NOTE — PROGRESS NOTES
to Clinical Documentation Reviewer    PROVIDER RESPONSE TEXT:    This patient has CKD Stage 3a.     Query created by: Ash Diana on 10/27/2021 2:59 PM      Electronically signed by:  Allison Colon 10/28/2021 8:19 AM

## 2021-10-28 NOTE — PLAN OF CARE
Problem:  Activity Intolerance:  Goal: Ability to tolerate increased activity will improve  Description: Ability to tolerate increased activity will improve  Outcome: Ongoing     Problem: Cardiac Output - Decreased:  Goal: Hemodynamic stability will improve  Description: Hemodynamic stability will improve  Outcome: Ongoing     Problem: Nutrition  Goal: Optimal nutrition therapy  Outcome: Ongoing

## 2021-10-28 NOTE — PLAN OF CARE
Problem: Discharge Planning:  Goal: Discharged to appropriate level of care  Description: Discharged to appropriate level of care  Outcome: Ongoing     Problem:  Activity Intolerance:  Goal: Ability to tolerate increased activity will improve  Description: Ability to tolerate increased activity will improve  10/28/2021 1145 by Rafael Dean RN  Outcome: Ongoing     Problem: Cardiac Output - Decreased:  Goal: Hemodynamic stability will improve  Description: Hemodynamic stability will improve  10/28/2021 1145 by Rafael Dean RN  Outcome: Ongoing     Problem: Fluid Volume - Excess:  Goal: Control of fluid volume excess will improve  Description: Control of fluid volume excess will improve  Outcome: Ongoing     Problem: Gas Exchange - Impaired:  Goal: Levels of oxygenation will improve  Description: Levels of oxygenation will improve  Outcome: Ongoing     Problem: Mood - Altered:  Goal: Mood stable  Description: Mood stable  Outcome: Ongoing     Problem: Tissue Perfusion - Cardiopulmonary, Altered:  Goal: Absence of angina  Description: Absence of angina  Outcome: Ongoing     Problem: Tissue Perfusion - Cardiopulmonary, Altered:  Goal: Circulation will improve to fullest extent possible  Description: Circulation will improve to fullest extent possible  Outcome: Ongoing     Problem: Tissue Perfusion - Cardiopulmonary, Altered:  Goal: Hemodynamic stability will improve  Description: Hemodynamic stability will improve  Outcome: Ongoing     Problem: Tobacco Use:  Goal: Will participate in inpatient tobacco-use cessation counseling  Description: Will participate in inpatient tobacco-use cessation counseling  Outcome: Ongoing     Problem: Venous Thromboembolism:  Goal: Will show no signs or symptoms of venous thromboembolism  Description: Will show no signs or symptoms of venous thromboembolism  Outcome: Ongoing     Problem: Venous Thromboembolism:  Goal: Absence of signs or symptoms of impaired coagulation  Description: Absence of signs or symptoms of impaired coagulation  Outcome: Ongoing     Problem:  Bowel Function - Altered:  Goal: Bowel elimination is within specified parameters  Description: Bowel elimination is within specified parameters  Outcome: Ongoing     Problem: Fluid Volume - Imbalance:  Goal: Absence of imbalanced fluid volume signs and symptoms  Description: Absence of imbalanced fluid volume signs and symptoms  Outcome: Ongoing     Problem: Fluid Volume - Imbalance:  Goal: Will show no signs and symptoms of excessive bleeding  Description: Will show no signs and symptoms of excessive bleeding  Outcome: Ongoing     Problem: Nausea/Vomiting:  Goal: Absence of nausea/vomiting  Description: Absence of nausea/vomiting  Outcome: Ongoing     Problem: Nausea/Vomiting:  Goal: Able to drink  Description: Able to drink  Outcome: Ongoing     Problem: Nausea/Vomiting:  Goal: Able to eat  Description: Able to eat  Outcome: Ongoing     Problem: Nausea/Vomiting:  Goal: Ability to achieve adequate nutritional intake will improve  Description: Ability to achieve adequate nutritional intake will improve  Outcome: Ongoing     Problem: Nutrition  Goal: Optimal nutrition therapy  10/28/2021 1145 by David Toribio RN  Outcome: Ongoing

## 2021-10-28 NOTE — PROGRESS NOTES
(1.829 m)   Wt 243 lb (110.2 kg)   SpO2 95%   BMI 32.96 kg/m²   General appearance: alert, appears stated age and cooperative  Skin: Skin color, texture, turgor normal.   HEENT: Head: Normocephalic, no lesions, without obvious abnormality. Neck: no adenopathy, no carotid bruit, no JVD, supple, symmetrical, trachea midline and thyroid not enlarged, symmetric, no tenderness/mass/nodules  Lungs: diminished breath sounds bibasilar and bilaterally  Heart: irregularly irregular rhythm  Abdomen: soft, non-tender; bowel sounds normal; no masses,  no organomegaly  Extremities: edema mild pretibial edema and Homans sign is negative, no sign of DVT  Lymphatic: No significant lymph node enlargement papable  Neurologic: Mental status: Alert, oriented, thought content appropriate      Assessment & Plan:    Patient Active Problem List:      Acute on chronic diastolic CHF      Severe anemia/iron deficiency       ckd stage 3a now improving       Type 2 diabetes mellitus      Gi bleeding positive hemacult //awaiting edg /colonoscopy        Severe pulmonary hypertension       Permanent atrial fibrillation      Decompensated heart failure (HCC)     Anemia, blood loss     Elevated LFTs -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 O2     ?treatment for pulmonary hypertenson  --tadalafil?   ?aldactone 25 now that renal function improving   Repeat ct in 4-6 month    pulmonary consult as out patient unless change   stiolta daily    pft as outpatient when stable  [address]

## 2021-10-28 NOTE — PROGRESS NOTES
Subjective: The patient reports he feels relatively well this morning. He states he did not sleep well last night; however, he has no other complaints. He denies chest discomfort, dyspnea and palpitations. Objective  Vitals: Blood pressure 114/72, heart rate 85, respiratory 16  Cardiovascular: S1, S2. Irregularly irregular. Respiratory: Vesicular breath sound  Abdomen: Soft    Impression  1. Longstanding persistent atrial fibrillation. His heart rate is controlled. Eliquis is currently being held due to anemia and planned GI work-up. Recommendations   1. Continue current medications. 2.  Await results of the EGD and colonoscopy today.

## 2021-10-28 NOTE — OP NOTE
PROCEDURE NOTE    DATE OF PROCEDURE: 10/28/2021    SURGEON: Melva Robertson MD  Facility : [facility]  ASSISTANT: None  Anesthesia: MAC  PREOPERATIVE DIAGNOSIS: Anemia    POSTOPERATIVE DIAGNOSIS: as described below    OPERATION: Total colonoscopy     ANESTHESIA: Moderate Sedation    ESTIMATED BLOOD LOSS: less than 50     COMPLICATIONS: None. SPECIMENS:  Was Obtained:       Sigmoid polyp 1 cm snared with hot snare    Transverse colon polyps 2 of them the larger was 6 mm removed with cold biopsy    Significant diverticulosis of the left colon      Large hemorrhoids              HISTORY: The patient is a 69y.o. year old male with history of above preop diagnosis. I recommended colonoscopy with possible biopsy or polypectomy and I explained the risk, benefits, expected outcome, and alternatives to the procedure. Risks included but are not limited to bleeding, infection, respiratory distress, hypotension, and perforation of the colon and possibility of missing a lesion. The patient understands and is in agreement. The patient was counseled at length about the risks of luis Covid-19 during their perioperative period and any recovery window from their procedure. The patient was made aware that luis Covid-19  may worsen their prognosis for recovering from their procedure  and lend to a higher morbidity and/or mortality risk. All material risks, benefits, and reasonable alternatives including postponing the procedure were discussed. The patient does wish to proceed with the procedure at this time. PROCEDURE: The patient was given IV conscious sedation. The patient's SPO2 remained above 90% throughout the procedure. The colonoscope was inserted per rectum and advanced under direct vision to the cecum without difficulty. Post sedation note : The patient's SPO2 remained above 90% throughout the procedure. the vital signs remained stable , and no immediate complication form the procedure noted, patient will be ready for d/c when criteria is met . The prep was fair. Findings:  Terminal ileum: normal    Cecum/Ascending colon: normal    Transverse colon: abnormal: Transverse colon polyps 2 of them the larger was 6 mm removed with cold biopsy        Descending/Sigmoid colon: abnormal:   Sigmoid polyp 1 cm snared with hot snare  Significant diverticulosis of the left colon      Rectum/Anus: examined in normal and retroflexed positions and was abnormal: Large hemorrhoids       Withdrawal Time was (minutes): 10    The colon was decompressed and the scope was removed. The patient tolerated the procedure well. Recommendations/Plan:   1. Okay to discharge patient and follow-up patient  2. Follow-up biopsies  3. F/U In OfficeYes  4. Discussed with the family  5.  Repeat colonoscopy [id number]    Electronically signed by Mervin Ceron MD  on 10/28/2021 at 4:22 PM

## 2021-10-28 NOTE — ANESTHESIA PRE PROCEDURE
Department of Anesthesiology  Preprocedure Note       Name:  Yi Pinto   Age:  69 y.o.  :  1950                                          MRN:  [id number]         Date:  10/28/2021      Surgeon: Dolly Hines):  Graham Antoine MD    Procedure: Procedure(s):  EGD DIAGNOSTIC ONLY  COLONOSCOPY DIAGNOSTIC    Medications prior to admission:   Prior to Admission medications    Medication Sig Start Date End Date Taking?  Authorizing Provider   Semaglutide, 1 MG/DOSE, (OZEMPIC, 1 MG/DOSE,) 4 MG/3ML SOPN Inject 1 mg into the skin once a week    Yes Historical Provider, MD   insulin glargine (BASAGLAR KWIKPEN) 100 UNIT/ML injection pen Inject 32 Units into the skin daily    Yes Historical Provider, MD   metFORMIN (GLUCOPHAGE) 850 MG tablet Take 850 mg by mouth 2 times daily (with meals)   Yes Historical Provider, MD   apixaban (ELIQUIS) 5 MG TABS tablet Take 5 mg by mouth 2 times daily    Yes Historical Provider, MD   atorvastatin (LIPITOR) 10 MG tablet Take 10 mg by mouth daily   Yes Historical Provider, MD   furosemide (LASIX) 20 MG tablet Take 20 mg by mouth daily    Yes Historical Provider, MD   pioglitazone (ACTOS) 45 MG tablet Take 45 mg by mouth daily   Yes Historical Provider, MD   allopurinol (ZYLOPRIM) 300 MG tablet Take 300 mg by mouth daily   Yes Historical Provider, MD   colchicine (MITIGARE) 0.6 MG capsule Take 0.6 mg by mouth daily   Yes Historical Provider, MD   metoprolol succinate (TOPROL XL) 50 MG extended release tablet Take 50 mg by mouth daily   Yes Historical Provider, MD       Current medications:    Current Facility-Administered Medications   Medication Dose Route Frequency Provider Last Rate Last Admin    spironolactone (ALDACTONE) tablet 25 mg  25 mg Oral Daily Dori SUGEY Rodriguez        furosemide (LASIX) injection 20 mg  20 mg IntraVENous BID Dori Rodriguez   20 mg at 10/28/21 0816    iron sucrose (VENOFER) 200 mg in sodium chloride 0.9 % 100 mL IVPB  200 mg IntraVENous Q24H Iona Chavez   Stopped at 10/28/21 0915    ondansetron (ZOFRAN-ODT) disintegrating tablet 4 mg  4 mg Oral Q8H PRN Dori Rodriguez        Or    ondansetron (ZOFRAN) injection 4 mg  4 mg IntraVENous Q6H PRN Dori Rodriguez        pantoprazole (PROTONIX) injection 40 mg  40 mg IntraVENous Daily Dori Rodriguez   40 mg at 10/28/21 0816    sodium chloride flush 0.9 % injection 5-40 mL  5-40 mL IntraVENous 2 times per day Dori Rodriguez   10 mL at 10/28/21 9776    sodium chloride flush 0.9 % injection 5-40 mL  5-40 mL IntraVENous PRN Dori Rodriguez        0.9 % sodium chloride infusion  25 mL IntraVENous PRN Dori Rodriguez        potassium chloride (KLOR-CON M) extended release tablet 40 mEq  40 mEq Oral PRN Dori Rodriguez   40 mEq at 10/27/21 3840    Or    potassium bicarb-citric acid (EFFER-K) effervescent tablet 40 mEq  40 mEq Oral PRN Iona Alfaroadityas        Or    potassium chloride 10 mEq/100 mL IVPB (Peripheral Line)  10 mEq IntraVENous PRN Dori Rodriguez        potassium chloride 10 mEq/100 mL IVPB (Peripheral Line)  10 mEq IntraVENous PRN Dori Rodriguez        magnesium sulfate 1000 mg in dextrose 5% 100 mL IVPB  1,000 mg IntraVENous PRN Dori Rodriguez        polyethylene glycol (GLYCOLAX) packet 17 g  17 g Oral Daily PRN Dori Rodriguez        acetaminophen (TYLENOL) tablet 650 mg  650 mg Oral Q6H PRN Dori Rodriguez        Or    acetaminophen (TYLENOL) suppository 650 mg  650 mg Rectal Q6H PRN Dori Rodriguez        glucose (GLUTOSE) 40 % oral gel 15 g  15 g Oral PRN Dori Rodriguez        dextrose 50 % IV solution  12.5 g IntraVENous PRN Dori Rodriguez        glucagon (rDNA) injection 1 mg  1 mg IntraMUSCular PRN Dori Rodriguez        dextrose 5 % solution  100 mL/hr IntraVENous PRN Dori Rodriguez        [Held by provider] insulin glargine (LANTUS) injection vial 30 Units  30 Units SubCUTAneous Nightly Shyrl Asa   30 Units at 10/26/21 2218    insulin lispro (HUMALOG) injection vial 0-12 Units  0-12 Units SubCUTAneous TID WC Dori Rodriguez   2 Units at 10/26/21 1239    atorvastatin (LIPITOR) tablet 10 mg  10 mg Oral Daily Dori Rodriguez   10 mg at 10/28/21 9527    losartan (COZAAR) tablet 50 mg  50 mg Oral Daily Dori SUGEY Rodriguez   50 mg at 10/28/21 3600    metoprolol succinate (TOPROL XL) extended release tablet 50 mg  50 mg Oral Daily Dori Rodriguez   50 mg at 10/28/21 2942    allopurinol (ZYLOPRIM) tablet 100 mg  100 mg Oral Daily Dori Rodriguez   100 mg at 10/28/21 0815    [Held by provider] apixaban (ELIQUIS) tablet 2.5 mg  2.5 mg Oral BID Dori Rodriguez        0.9 % sodium chloride infusion   IntraVENous PRN Dori Rodriguez        zolpidem (AMBIEN) tablet 5 mg  5 mg Oral Nightly PRN Dori Rodriguez   5 mg at 10/26/21 2218       Allergies:  No Known Allergies    Problem List:    Patient Active Problem List   Diagnosis Code    Decompensated heart failure (HCC) I50.9    Anemia, blood loss D50.0    Elevated LFTs R79.89       Past Medical History:        Diagnosis Date    Atrial fibrillation ([facility] [address]     Diabetes mellitus ([facility] [address]        Past Surgical History:  History reviewed. No pertinent surgical history. Social History:    Social History     Tobacco Use    Smoking status: Former Smoker   Substance Use Topics    Alcohol use:  Yes                                Counseling given: Not Answered      Vital Signs (Current):   Vitals:    10/28/21 0619 10/28/21 0733 10/28/21 1124 10/28/21 1519   BP:  115/62 104/68 128/75   Pulse:  89 91 94   Resp:  18 18 18   Temp:  98.1 °F (36.7 °C) 98.1 °F (36.7 °C) 98.1 °F (36.7 °C)   TempSrc:  Oral Oral Oral   SpO2:  95% 92% 92%   Weight: 243 lb (110.2 kg)      Height:                                                  BP Readings from Last 3 Encounters:   10/28/21 128/75       NPO Status: BMI:   Wt Readings from Last 3 Encounters:   10/28/21 243 lb (110.2 kg)     Body mass index is 32.96 kg/m². CBC:   Lab Results   Component Value Date    WBC 6.7 10/27/2021    RBC 3.44 10/27/2021    HGB 7.6 10/28/2021    HCT 25.5 10/28/2021    MCV 74.4 10/27/2021    RDW 20.8 10/27/2021     10/27/2021       CMP:   Lab Results   Component Value Date     10/28/2021    K 3.7 10/28/2021    CL 98 10/28/2021    CO2 25 10/28/2021    BUN 31 10/28/2021    CREATININE 1.20 10/28/2021    GFRAA >60 10/28/2021    LABGLOM 60 10/28/2021    GLUCOSE 66 10/28/2021    PROT 6.4 10/28/2021    CALCIUM 8.5 10/28/2021    BILITOT 1.11 10/28/2021    ALKPHOS 137 10/28/2021    AST 48 10/28/2021    ALT 43 10/28/2021       POC Tests:   Recent Labs     10/28/21  1103   POCGLU 85       Coags:   Lab Results   Component Value Date    PROTIME 19.2 10/25/2021    INR 1.7 10/25/2021    APTT 49.6 10/25/2021       HCG (If Applicable): No results found for: PREGTESTUR, PREGSERUM, HCG, HCGQUANT     ABGs: No results found for: PHART, PO2ART, RNL0KUV, ZFH2TMK, BEART, K8MITHMK     Type & Screen (If Applicable):  No results found for: LABABO, LABRH    Drug/Infectious Status (If Applicable):  Lab Results   Component Value Date    HEPCAB NONREACTIVE 10/26/2021       COVID-19 Screening (If Applicable): No results found for: COVID19        Anesthesia Evaluation    Airway: Mallampati: II  TM distance: >3 FB   Neck ROM: full  Mouth opening: > = 3 FB Dental:          Pulmonary:       (-) shortness of breath                           Cardiovascular:    (+) dysrhythmias: atrial fibrillation,     (-)  angina and murmur                Neuro/Psych:               GI/Hepatic/Renal:             Endo/Other:    (+) Diabetes, . Abdominal:             Vascular:           Other Findings:             Anesthesia Plan      general     ASA 4                         pulm htn, Nicole Matias MD 10/28/2021

## 2021-10-28 NOTE — PROGRESS NOTES
PROGRESS NOTE    Admit Date: 10/25/2021         Subjective: patient feeling a bit better , hemoglobin 6.8 now 7.7 received one unit prbc and now on iron therapy , no chest pain some sob and cough , good urine out put , no nausea , taking po well anticoagulation on hold , concern or right shoulder and hip pain        Diet: ADULT DIET; Clear Liquid  Diet NPO Exceptions are: Sips of Water with Meds  Pain is:None  Nausea:None  Bowel Movement/Flatus yes    Data:   Scheduled Meds: Reviewed  Continuous Infusions:   sodium chloride      dextrose      sodium chloride         Intake/Output Summary (Last 24 hours) at 10/27/2021 2314  Last data filed at 10/27/2021 2255  Gross per 24 hour   Intake 200 ml   Output 1675 ml   Net -1475 ml     Hematology:  Recent Labs     10/25/21  2051 10/25/21  2051 10/26/21  0508 10/26/21  1546 10/27/21  0554   WBC 8.6  --  7.1  --  6.7   HGB 7.9*   < > 6.8* 8.1* 7.7*   HCT 26.7*   < > 23.2* 27.8* 25.6*     --  310  --  322   INR 1.7  --   --   --   --     < > = values in this interval not displayed.      Chemistry:  Recent Labs     10/25/21  2051 10/26/21  0508 10/27/21  0554   * 136 137   K 4.3 3.6* 3.4*    103 102   CO2 19* 20 20   GLUCOSE 217* 143* 95   BUN 53* 49* 42*   CREATININE 1.65* 1.45* 1.46*   MG 2.1 2.0 1.9   ANIONGAP 15 13 15   LABGLOM 41* 48* 48*   GFRAA 50* 58* 58*   CALCIUM 8.9 8.5* 8.6     Recent Labs     10/26/21  0508 10/27/21  0554   PROT 6.8  --    LABALBU 3.5  --    LABA1C 6.5*  --    AST 57*  --    ALT 46*  --    ALKPHOS 139*  --    BILITOT 0.73  --    BILIDIR 0.40*  --    CHOL  --  106   HDL  --  38*   LDLCHOLESTEROL  --  52   CHOLHDLRATIO  --  2.8   TRIG  --  79   VLDL  --  NOT REPORTED       -----------------------------------------------------------------  RAD:    Collected: 10/27/21 1004      Order Status: Completed Updated: 10/27/21 9244     Narrative:       EXAMINATION:   ONE XRAY VIEW OF THE RIGHT HIP; ONE XRAY VIEW OF THE RIGHT SHOULDER 10/27/2021 9:59 am     COMPARISON:   CT 10/25/2021     HISTORY:   ORDERING SYSTEM PROVIDED HISTORY: right hip pain   TECHNOLOGIST PROVIDED HISTORY:   right hip pain   Acuity: Chronic   Type of Exam: Unknown     Right shoulder pain     FINDINGS:   Only one view of the right shoulder and right hip were obtained as ordered. Right shoulder:  Degenerative changes of the right shoulder including the AC   joint.  Apparent narrowing of the subacromial joint space suggests rotator   cuff arthropathy.  MRI could assess the rotator cuff if clinically warranted. Monitor leads and snaps from the patient's gown partially obscure the   shoulder. Right hip: Moderate degenerative changes right hip without convincing   evidence of acute fracture.      Impression:       Slightly limited exam as only one view of each joint was obtained (as   requested).  No convincing evidence of acute fracture. Moderate degenerative changes right hip. Moderate degenerative changes right shoulder.      XR SHOULDER RIGHT 1 VW [[id number]] Collected: 10/27/21 1004     Order Status: Completed Updated: 10/27/21 1116     Narrative:       EXAMINATION:   ONE XRAY VIEW OF THE RIGHT HIP; ONE XRAY VIEW OF THE RIGHT SHOULDER     10/27/2021 9:59 am     COMPARISON:   CT 10/25/2021     HISTORY:   ORDERING SYSTEM PROVIDED HISTORY: right hip pain   TECHNOLOGIST PROVIDED HISTORY:   right hip pain   Acuity: Chronic   Type of Exam: Unknown     Right shoulder pain     FINDINGS:   Only one view of the right shoulder and right hip were obtained as ordered. Right shoulder:  Degenerative changes of the right shoulder including the AC   joint.  Apparent narrowing of the subacromial joint space suggests rotator   cuff arthropathy.  MRI could assess the rotator cuff if clinically warranted. Monitor leads and snaps from the patient's gown partially obscure the   shoulder.      Right hip: Moderate degenerative changes right hip without convincing evidence of acute fracture.      Impression:       Slightly limited exam as only one view of each joint was obtained (as   requested).  No convincing evidence of acute fracture. Moderate degenerative changes right hip. Moderate degenerative changes right shoulder.      US GALLBLADDER RUQ [[id number]] Collected: 10/26/21 0845     Order Status: Completed Updated: 10/26/21 0951     Narrative:       EXAMINATION:   RIGHT UPPER QUADRANT ULTRASOUND     10/26/2021 6:58 am     COMPARISON:   CT 10/25/2021     HISTORY:   ORDERING SYSTEM PROVIDED HISTORY: abnormal ct   TECHNOLOGIST PROVIDED HISTORY:     abnormal ct     FINDINGS:   LIVER:  The liver measures up to 20 cm with coarse echotexture and subtle   nodular contour.  No focal liver lesion identified.  The main portal vein is   patent and demonstrates antegrade flow.  No evidence for intrahepatic biliary   dilatation. BILIARY SYSTEM:  The gallbladder is relatively contracted.  Wall thickness   measures up to 5 mm.  No pericholecystic fluid or stones identified. Negative [place] sign. Common bile duct is within normal limits measuring 2 mm. RIGHT KIDNEY: The right kidney is grossly unremarkable without evidence of   hydronephrosis. PANCREAS:  Visualized portions of the pancreas are unremarkable. OTHER: Trace perihepatic ascites.      Impression:       1.  Findings suggestive of chronic liver disease. 2.  Nonspecific gallbladder wall thickening without stones or convincing   evidence for acute cholecystitis.  No biliary dilatation.      3.  Trace perihepatic ascites.      CT CHEST ABDOMEN PELVIS WO CONTRAST [[id number]] Collected: 10/25/21 2202     Order Status: Completed Updated: 10/25/21 2254     Narrative:       EXAMINATION:   CT OF THE CHEST, ABDOMEN, AND PELVIS WITHOUT CONTRAST 10/25/2021 6:45 pm     TECHNIQUE:   CT of the chest, abdomen and pelvis was performed without the administration   of intravenous contrast. Multiplanar reformatted images are provided for   review. Dose modulation, iterative reconstruction, and/or weight based   adjustment of the mA/kV was utilized to reduce the radiation dose to as low   as reasonably achievable. COMPARISON:   Chest radiograph from today's date     HISTORY:   ORDERING SYSTEM PROVIDED HISTORY: Elevated lactic acid, dyspnea   TECHNOLOGIST PROVIDED HISTORY:   Elevated lactic acid, dyspnea     Decision Support Exception - unselect if not a suspected or confirmed   emergency medical condition->Emergency Medical Condition (MA)   Reason for Exam: SOB, general malaise and fatigue x 2-3 weeks, elevated   lactic. Acuity: Acute   Type of Exam: Initial     FINDINGS:     Chest:     Mediastinum: Evaluation is limited by the absence of contrast.  Mediastinal   fat is normal in attenuation.  No adenopathy.  Subcentimeter hypodense nodule   in the left thyroid lobe which warrants no specific follow-up. Eletha Stands is   grossly normal caliber with mild-to-moderate atherosclerotic calcification. Main pulmonary artery is mildly enlarged at 3.5 cm.  Cardiomegaly due to   biatrial enlargement.  3 vessel coronary artery calcifications.  Hypodense   intravascular blood pool.  Pericardial effusion. Lungs/pleura: Small left greater than right water attenuation pleural   effusions.  Linear bands of atelectasis with some small patchy areas of   consolidation in the lung bases.  9 mm solid noncalcified nodule with   somewhat spiculated margins in the posterior segment right upper lobe (series   6, image 60).  Similar appearing 7 mm solid noncalcified nodule in the   lateral segment right middle lobe (series 6, image 81).  8 mm solid   noncalcified nodule in lateral basal left lower lobe (series 6, image 75).  A   few scattered calcified granulomata from prior granulomatous infection.  No   pneumothorax.  Central airways are patent with minimal background bronchial   wall thickening.      Soft Tissues/Bones:       Abdomen/Pelvis: Organs: Evaluation is limited by the absence of contrast.  Mild hepatomegaly   spanning 19.2 cm craniocaudal.  Suggestion of a minimally nodular peripheral   hepatic contour.  Mild pericholecystic inflammatory change.  Spleen is normal   in size and attenuation.  Pancreas is atrophic.  Adrenal glands are within   normal limits.  No nephrolithiasis or hydronephrosis.  Vascular calcification   in the right renal sinus. GI/Bowel: Stomach is mildly distended with gas and fluid.  Duodenum is normal   caliber as is the remainder of the small bowel.  Normal appendix.  No colonic   obstruction. There is diverticulosis without evidence of diverticulitis. Pelvis: Urinary bladder and male pelvic organs are grossly unremarkable. Peritoneum/Retroperitoneum: Small volume simple ascites.  No free air. Graying of the central mesenteric fat with normal caliber intervening lymph   nodes.  Aortoiliac atherosclerosis without aneurysm. Bones/Soft Tissues: Mild anasarca.  Advanced degenerative changes of the   spine and hips.  Grade 1 degenerative anterolisthesis L4 on L5 with   associated moderate to severe spinal canal and severe bilateral   neuroforaminal stenosis.      Impression:       Bilateral left greater than right layering pleural effusions with adjacent   atelectasis.  Small scattered areas of consolidation in the lung bases may be   due to superimposed infection in the appropriate clinical setting. Solid noncalcified nodules in the right upper, right middle, and left lower   lobes.  See recommendation below. Cardiomegaly due to biatrial enlargement.  Pericardial effusion. Hypodense intravascular blood pool which can be seen in the setting of   anemia.  Correlate clinically. Hepatomegaly with suggestion of a slightly micronodular hepatic contour. Query possible underlying cirrhosis.      Gallbladder wall thickening which may relate to underlying systemic cause   such as the patient's cardiac or suspected hepatic disease, though clinical   correlation is required to exclude acute cholecystitis. Colonic diverticulosis without evidence of diverticulitis. Small volume ascites, graying of the mesenteric fat, and anasarca. Grade 1 degenerative anterolisthesis L4 on L5 with moderate to severe spinal   canal and severe bilateral neuroforaminal stenosis. RECOMMENDATIONS:   Recommend a non-contrast Chest CT at 3-6 months, then another non-contrast   Chest CT at 18-24 months. Note: This recommendation does not apply to   patients younger than 35 years, immunocompromised patients, and patients with   cancer. F/u in patients with significant comorbidities as clinically   warranted. For lung cancer screening, adhere to Lung-RADS guidelines. Reference: Radiology. 2017 Jul; 284(1):228-243      XR CHEST PORTABLE [[id number]] Collected: 10/25/21 2114     Order Status: Completed Updated: 10/25/21 2134     Narrative:       EXAMINATION:   ONE XRAY VIEW OF THE CHEST     10/25/2021 7:59 pm     COMPARISON:   None. HISTORY:   ORDERING SYSTEM PROVIDED HISTORY: Chest Pain   TECHNOLOGIST PROVIDED HISTORY:   Chest Pain   Reason for Exam: AFib. sent by PCP for workup   Acuity: Unknown   Type of Exam: Unknown     FINDINGS:   A portable upright frontal view chest radiograph was obtained. The heart is enlarged.  Trace left pleural effusion.  The mediastinal   contours and pleural spaces are otherwise within normal limits.  Minimal   atelectasis is present at the left lung base.  The lungs are otherwise clear. There is no focal consolidation or pneumothorax.  The pulmonary vascular   pattern is within normal limits.  No significant thoracic osseous abnormality.      Impression:       Minimal left basilar atelectasis.  Otherwise, clear lungs.  Cardiomegaly. Trace left pleural effusion.            Echocardiogram 2D W M-Mode  Study Date: 10/26/2021  Patient:  Yaritza Bobby 69 y.o.     Reading provider:  Roseanne Edmond MD   Ordering provider:  Yuliya Lira   Result Information    Status: Final result (Resulted: 10/26/2021 21:43) Provider Status: Ordered   Echocardiogram 2D W M-Mode  Order: [id number]  Status:  Final result   Visible to patient:  No (not released)   Next appt:  None   0 Result Notes  Details    Reading Physician Reading Date Result Priority   Roseanne Edmond MD  [phone] 10/26/2021       Narrative & Impression  [facility]     Transthoracic Echocardiography Report (TTE)      Patient Name Tawnya Brittle  Date of Study               10/26/2021                Tari Marte      Date of      1950  Gender                      Male   Birth      Age          69 year(s)  Race                              Room Number  1001        Height:                     72 inch, 182.88 cm      Corporate ID [id number]    Weight:                     250 pounds, 113.4 kg   #      Patient Acct [de-identified]   BSA:          2.34 m^2      BMI:      33.91   #                                                              kg/m^2      MR #         [id number]     Sonographer                 Iveth Gross      Accession #  [id number]  Interpreting Physician      Celeste Mcdermott      Fellow                   Referring Nurse                            Practitioner      Interpreting             Referring Physician         Duane Serrano     Type of Study      TTE procedure:2D Echocardiogram, M-Mode, Doppler, Color Doppler. Procedure Date  Date: 10/26/2021 Start: 09:43 AM     Study Location: [facility]  Technical Quality: Adequate visualization     Indications:Congestive heart failure.     History / Tech. Comments:  Procedure explained to patient.     Patient Status: Inpatient     Height: 72 inches Weight: 250.01 pounds BSA: 2.34 m^2 BMI: 33.91 kg/m^2     Rhythm: Atrial fibrillation HR: 95 bpm     CONCLUSIONS     Summary  Left ventricle is normal in size.  Mild left ventricular hypertrophy. Global left ventricular systolic function is normal with an estimated  ejection fraction of 60 % . Left atrium is moderately dilated. Right atrium is moderately dilated . Aortic leaflets show calcification without restriction of motion. Moderate tricuspid regurgitation. Severe pulmonary hypertension. Estimated right ventricular systolic pressure  is [id number]. Small to Moderate pericardial effusion.     Signature  ----------------------------------------------------------------------------   Electronically signed by Iveth Gross(Sonographer) on 10/26/2021 12:08   PM  ----------------------------------------------------------------------------     ----------------------------------------------------------------------------   Electronically signed by Phill Anthony(Interpreting physician) on   10/26/2021 09:43 PM  ----------------------------------------------------------------------------  FINDINGS  Left Atrium  Left atrium is moderately dilated. Left Ventricle  Left ventricle is normal in size. Mild left ventricular hypertrophy. Objective:   Vitals: /77   Pulse 81   Temp 97.7 °F (36.5 °C) (Oral)   Resp 18   Ht 6' (1.829 m)   Wt 242 lb 7 oz (110 kg)   SpO2 99%   BMI 32.88 kg/m²   General appearance: alert, appears stated age and cooperative  Skin: Skin color, texture, turgor normal.   HEENT: Head: Normocephalic, no lesions, without obvious abnormality.   Neck: no adenopathy, no carotid bruit, no JVD, supple, symmetrical, trachea midline and thyroid not enlarged, symmetric, no tenderness/mass/nodules  Lungs: diminished breath sounds bibasilar and bilaterally and rales bibasilar  Heart: irregularly irregular rhythm  Abdomen: soft, non-tender; bowel sounds normal; no masses,  no organomegaly  Extremities: edema 1 plus edema  Lymphatic: No significant lymph node enlargement papable  Neurologic: Mental status: Alert, oriented, thought content appropriate      Assessment & Plan: Patient Active Problem List:    Acute on chronic diastolic chf     Severe pulmonary hypertension      Liver disease likely due to alcohol use /cool /passive congestion secondary to pulmonary hypertension// cirrhosis suggested by ct abdomen ,     Consider aldactone 25 daily            Acute anemia , anticoagulation and concomitant nsaid use    Gastritis , concern about potential for esopahgeal varice     ckd stage 3 a , (etiology type 2 dm /nephropathy , hypertensive disease , hepatorenal syndrome     Hypokalemia     Type 2 diabetes mellitus     Decompensated heart failure (HCC)     Anemia, blood loss     Elevated LFTs      See orders   Disposition:  Continue iv iron ,hold eliquis , PPI , iv , if continue to drop below 7 transfuse  moniter renal function   Xray hip /shoulder  Echo reviewed as above , severe pulmonary hypertension   o2 nocturnal  Sleep evaluation as out patient   edg /colonoscopy as an out patient     Manpreet Mera

## 2021-10-28 NOTE — H&P
[facility] PHYSICIAN  History and Physical      Name: Ollie Atwood  MRN: [id number]     Acct: [de-identified]  Room: [address] [place] [address]    Admit Date: 10/25/2021  PCP: Iona Chavez    Chief Complaint:     Chief Complaint   Patient presents with   Theodoro Milder Other     pt states he thinks hes anemic and his dr told him to go to ER to get checked out    Atrial Fibrillation       History Obtained From:     patient    History of Present Illness:      Ollie Atwood is a  69 y.o.  male who presents with Other (pt states he thinks hes anemic and his dr told him to go to ER to get checked out) and Atrial Fibrillation  hx of sob with exertion weakness pallor , anorexia for the past 3 weeks  No chest pain , mild edema  Present to office with shoulder and hip pain       Past Medical History:     Past Medical History:   Diagnosis Date    Atrial fibrillation ([facility] [address]     Diabetes mellitus ([facility] [address]         Past Surgical History:     History reviewed. No pertinent surgical history. Medications Prior to Admission:       Prior to Admission medications    Medication Sig Start Date End Date Taking?  Authorizing Provider   Semaglutide, 1 MG/DOSE, (OZEMPIC, 1 MG/DOSE,) 4 MG/3ML SOPN Inject 1 mg into the skin once a week    Yes Historical Provider, MD   insulin glargine (BASAGLAR KWIKPEN) 100 UNIT/ML injection pen Inject 32 Units into the skin daily    Yes Historical Provider, MD   metFORMIN (GLUCOPHAGE) 850 MG tablet Take 850 mg by mouth 2 times daily (with meals)   Yes Historical Provider, MD   apixaban (ELIQUIS) 5 MG TABS tablet Take 5 mg by mouth 2 times daily    Yes Historical Provider, MD   atorvastatin (LIPITOR) 10 MG tablet Take 10 mg by mouth daily   Yes Historical Provider, MD   furosemide (LASIX) 20 MG tablet Take 20 mg by mouth daily    Yes Historical Provider, MD   pioglitazone (ACTOS) 45 MG tablet Take 45 mg by mouth daily   Yes Historical Provider, MD   allopurinol (ZYLOPRIM) 300 MG tablet Take 300 mg by mouth daily   Yes Historical Provider, MD   colchicine (MITIGARE) 0.6 MG capsule Take 0.6 mg by mouth daily   Yes Historical Provider, MD   metoprolol succinate (TOPROL XL) 50 MG extended release tablet Take 50 mg by mouth daily   Yes Historical Provider, MD        Allergies:       Patient has no known allergies. Social History:     Tobacco:    reports that he has quit smoking. He does not have any smokeless tobacco history on file. Alcohol:      reports current alcohol use. Drug Use:  reports no history of drug use. Family History:     History reviewed. No pertinent family history.     Review of Systems:     Positive and Negative as described in HPI    Constitutional:  negative for  fevers, chills, sweats, fatigue, and weight loss  HEENT:  negative for vision or hearing changes,   Respiratory:  negative for shortness of breath, cough, or congestion  Cardiovascular:  negative for  chest pain, palpitations  Gastrointestinal:  negative for nausea, vomiting, diarrhea, constipation, abdominal pain  Genitourinary:  negative for frequency, dysuria  Integument/Breast:  negative for rash, skin lesions  Musculoskeletal:  negative for muscle aches or joint pain  Neurological:  negative for headaches, dizziness, lightheadedness, numbness, pain and tingling extrimities  Behavior/Psych:  negative for depression and anxiety    Code Status:  Full Code    Physical Exam:     Vitals:  /77   Pulse 81   Temp 97.7 °F (36.5 °C) (Oral)   Resp 18   Ht 6' (1.829 m)   Wt 242 lb 7 oz (110 kg)   SpO2 99%   BMI 32.88 kg/m²   Temp (24hrs), Av.1 °F (36.7 °C), Min:97.7 °F (36.5 °C), Max:98.4 °F (36.9 °C)      General appearance - alert, well appearing, and in no acute distress marked pallor  Mental status - oriented to person, place, and time with normal affect  Head - normocephalic and atraumatic  Eyes - pupils equal and reactive, extraocular eye movements intact, conjunctiva clear  Ears - hearing appears to be intact  Nose - no drainage noted  Mouth - mucous membranes moist  Neck - supple, no carotid bruits, thyroid not palpable  Chest - decrease in breath sound bilateral , rales bibasilar   Heart - irregular rate, regular rhythm, no murmurs  Abdomen - soft, nontender, nondistended, bowel sounds present all four quadrants, no masses, hepatomegaly or splenomegaly  Neurological - normal speech, no focal findings or movement disorder noted, cranial nerves II through XII grossly intact  Extremities - peripheral pulses palpable, 2plus pedal edema or calf pain with palpation  Skin - no gross lesions, rashes, or induration noted    Osteopathic Examination: Unable to perform due to patients current medical condition    Data:     [unfilled]    Assesment:     Primary Problem  Severe anemia /iron deficiency /blood loss    Active Hospital Problems    Diagnosis Date Noted    Anemia, blood loss [D50.0]     Elevated LFTs [R79.89]     Decompensated heart failure ([facility] [address] [I50.9] 49/63/1826   chf diastolic acute  ckd stage 3 a  Type 2 dm with nephropathy  Suspected cirhosis rule out gastropathy vs varices    oa hip and shoulder right    atrial fibriillation        Plan:     1 see orderes   2 iv lasix , echo,    3 hold eliquis    4 continue arb, b blocker , diuretic use ,may benefit if k permits aldactone    5 o2 to get greater than 92   6 transfuse one unit pack rbc but then consider iv iron    7 xray shoulder /hip    8 edg /colonoscopy   9 echo     Electronically signed by Aurea Dunn on 10/27/2021 at 11:33 PM     Copy sent to Dr. Aurea Dunn

## 2021-10-28 NOTE — ANESTHESIA POSTPROCEDURE EVALUATION
Department of Anesthesiology  Postprocedure Note    Patient: Sandrine Morales  MRN: [id number]  YOB: 1950  Date of evaluation: 10/28/2021  Time:  5:36 PM     Procedure Summary     Date: 10/28/21 Room / Location: [place] [address] / [facility] - INPATIENT    Anesthesia Start: 1548 Anesthesia Stop: 1629    Procedures:       EGD BIOPSY (N/A )      COLONOSCOPY POLYPECTOMY HOT BIOPSY (N/A ) Diagnosis: (anemia)    Surgeons: Cony Davis MD Responsible Provider: Genaro Sun MD    Anesthesia Type: general ASA Status: 4          Anesthesia Type: general    Jian Phase I: Jian Score: 5    Jian Phase II:      Last vitals: Reviewed and per EMR flowsheets.        Anesthesia Post Evaluation    Complications: no

## 2021-10-29 ENCOUNTER — APPOINTMENT (OUTPATIENT)
Dept: GENERAL RADIOLOGY | Age: 71
DRG: 291 | End: 2021-10-29
Payer: MEDICARE

## 2021-10-29 LAB
ANION GAP SERPL CALCULATED.3IONS-SCNC: 15 MMOL/L (ref 9–17)
BUN BLDV-MCNC: 28 MG/DL (ref 8–23)
BUN/CREAT BLD: 20 (ref 9–20)
CALCIUM SERPL-MCNC: 8.8 MG/DL (ref 8.6–10.4)
CHLORIDE BLD-SCNC: 100 MMOL/L (ref 98–107)
CO2: 23 MMOL/L (ref 20–31)
CREAT SERPL-MCNC: 1.41 MG/DL (ref 0.7–1.2)
GFR AFRICAN AMERICAN: >60 ML/MIN
GFR NON-AFRICAN AMERICAN: 50 ML/MIN
GFR SERPL CREATININE-BSD FRML MDRD: ABNORMAL ML/MIN/{1.73_M2}
GFR SERPL CREATININE-BSD FRML MDRD: ABNORMAL ML/MIN/{1.73_M2}
GLUCOSE BLD-MCNC: 112 MG/DL (ref 75–110)
GLUCOSE BLD-MCNC: 115 MG/DL (ref 70–99)
GLUCOSE BLD-MCNC: 180 MG/DL (ref 75–110)
GLUCOSE BLD-MCNC: 204 MG/DL (ref 75–110)
GLUCOSE BLD-MCNC: 206 MG/DL (ref 75–110)
HCT VFR BLD CALC: 27.2 % (ref 40.7–50.3)
HEMOGLOBIN: 8 G/DL (ref 13–17)
MAGNESIUM: 1.8 MG/DL (ref 1.6–2.6)
POTASSIUM SERPL-SCNC: 3.6 MMOL/L (ref 3.7–5.3)
SMOOTH MUSCLE ANTIBODY: 7 UNITS (ref 0–19)
SODIUM BLD-SCNC: 138 MMOL/L (ref 135–144)

## 2021-10-29 PROCEDURE — 6360000002 HC RX W HCPCS: Performed by: INTERNAL MEDICINE

## 2021-10-29 PROCEDURE — C9113 INJ PANTOPRAZOLE SODIUM, VIA: HCPCS | Performed by: INTERNAL MEDICINE

## 2021-10-29 PROCEDURE — 36415 COLL VENOUS BLD VENIPUNCTURE: CPT

## 2021-10-29 PROCEDURE — 2580000003 HC RX 258: Performed by: INTERNAL MEDICINE

## 2021-10-29 PROCEDURE — 80048 BASIC METABOLIC PNL TOTAL CA: CPT

## 2021-10-29 PROCEDURE — 85018 HEMOGLOBIN: CPT

## 2021-10-29 PROCEDURE — 71045 X-RAY EXAM CHEST 1 VIEW: CPT

## 2021-10-29 PROCEDURE — 2060000000 HC ICU INTERMEDIATE R&B

## 2021-10-29 PROCEDURE — 6370000000 HC RX 637 (ALT 250 FOR IP): Performed by: FAMILY MEDICINE

## 2021-10-29 PROCEDURE — 82947 ASSAY GLUCOSE BLOOD QUANT: CPT

## 2021-10-29 PROCEDURE — 6360000002 HC RX W HCPCS: Performed by: FAMILY MEDICINE

## 2021-10-29 PROCEDURE — 85014 HEMATOCRIT: CPT

## 2021-10-29 PROCEDURE — 6370000000 HC RX 637 (ALT 250 FOR IP): Performed by: INTERNAL MEDICINE

## 2021-10-29 PROCEDURE — 83735 ASSAY OF MAGNESIUM: CPT

## 2021-10-29 RX ORDER — POLYETHYLENE GLYCOL 3350 17 G/17G
17 POWDER, FOR SOLUTION ORAL DAILY PRN
Qty: 527 G | Refills: 1 | Status: SHIPPED | OUTPATIENT
Start: 2021-10-29 | End: 2021-11-28

## 2021-10-29 RX ORDER — ALLOPURINOL 100 MG/1
100 TABLET ORAL DAILY
Qty: 30 TABLET | Refills: 3 | Status: SHIPPED | OUTPATIENT
Start: 2021-10-30

## 2021-10-29 RX ORDER — PANTOPRAZOLE SODIUM 40 MG/1
40 TABLET, DELAYED RELEASE ORAL
Qty: 30 TABLET | Refills: 5 | Status: SHIPPED | OUTPATIENT
Start: 2021-10-29

## 2021-10-29 RX ORDER — LOSARTAN POTASSIUM 50 MG/1
50 TABLET ORAL DAILY
Qty: 30 TABLET | Refills: 3 | Status: SHIPPED | OUTPATIENT
Start: 2021-10-29

## 2021-10-29 RX ORDER — FERROUS SULFATE 325(65) MG
325 TABLET ORAL
Qty: 60 TABLET | Refills: 5 | Status: SHIPPED | OUTPATIENT
Start: 2021-10-29

## 2021-10-29 RX ORDER — FUROSEMIDE 10 MG/ML
40 INJECTION INTRAMUSCULAR; INTRAVENOUS ONCE
Status: COMPLETED | OUTPATIENT
Start: 2021-10-29 | End: 2021-10-29

## 2021-10-29 RX ORDER — SPIRONOLACTONE 25 MG/1
25 TABLET ORAL DAILY
Qty: 30 TABLET | Refills: 3 | Status: SHIPPED | OUTPATIENT
Start: 2021-10-30

## 2021-10-29 RX ORDER — ASCORBIC ACID 250 MG
250 TABLET ORAL DAILY
Qty: 30 TABLET | Refills: 3 | Status: SHIPPED | OUTPATIENT
Start: 2021-10-29

## 2021-10-29 RX ORDER — FUROSEMIDE 20 MG/1
40 TABLET ORAL DAILY
Qty: 60 TABLET | Refills: 3 | Status: SHIPPED | OUTPATIENT
Start: 2021-10-29

## 2021-10-29 RX ADMIN — SODIUM CHLORIDE, PRESERVATIVE FREE 10 ML: 5 INJECTION INTRAVENOUS at 10:48

## 2021-10-29 RX ADMIN — ALLOPURINOL 100 MG: 100 TABLET ORAL at 10:19

## 2021-10-29 RX ADMIN — IRON SUCROSE 200 MG: 20 INJECTION, SOLUTION INTRAVENOUS at 10:19

## 2021-10-29 RX ADMIN — APIXABAN 2.5 MG: 2.5 TABLET, FILM COATED ORAL at 10:46

## 2021-10-29 RX ADMIN — FUROSEMIDE 40 MG: 10 INJECTION, SOLUTION INTRAMUSCULAR; INTRAVENOUS at 10:18

## 2021-10-29 RX ADMIN — SPIRONOLACTONE 25 MG: 25 TABLET ORAL at 10:19

## 2021-10-29 RX ADMIN — METOPROLOL SUCCINATE 50 MG: 50 TABLET, EXTENDED RELEASE ORAL at 10:19

## 2021-10-29 RX ADMIN — PANTOPRAZOLE SODIUM 40 MG: 40 INJECTION, POWDER, FOR SOLUTION INTRAVENOUS at 10:17

## 2021-10-29 RX ADMIN — ATORVASTATIN CALCIUM 10 MG: 10 TABLET, FILM COATED ORAL at 10:19

## 2021-10-29 RX ADMIN — INSULIN LISPRO 2 UNITS: 100 INJECTION, SOLUTION INTRAVENOUS; SUBCUTANEOUS at 12:07

## 2021-10-29 RX ADMIN — FUROSEMIDE 20 MG: 10 INJECTION, SOLUTION INTRAMUSCULAR; INTRAVENOUS at 18:19

## 2021-10-29 RX ADMIN — INSULIN LISPRO 4 UNITS: 100 INJECTION, SOLUTION INTRAVENOUS; SUBCUTANEOUS at 18:18

## 2021-10-29 RX ADMIN — SODIUM CHLORIDE, PRESERVATIVE FREE 10 ML: 5 INJECTION INTRAVENOUS at 20:41

## 2021-10-29 RX ADMIN — APIXABAN 2.5 MG: 2.5 TABLET, FILM COATED ORAL at 20:41

## 2021-10-29 ASSESSMENT — PAIN SCALES - GENERAL
PAINLEVEL_OUTOF10: 0

## 2021-10-29 NOTE — PROGRESS NOTES
Nocturnal Pulse Ox (NOX) set up on patient at this time. Patient on room air. RN and patient aware to keep oxygen off t/o study. Low SaO2 alarm set at 85%. RN to call RT if patient condition warrants oxygen placement. RA SaO2 at this time is 92.

## 2021-10-29 NOTE — PLAN OF CARE
Problem: Discharge Planning:  Goal: Discharged to appropriate level of care  Description: Discharged to appropriate level of care  10/29/2021 1254 by Juliana Knott RN  Outcome: Ongoing     Problem:  Activity Intolerance:  Goal: Ability to tolerate increased activity will improve  Description: Ability to tolerate increased activity will improve  10/29/2021 1254 by Juliana Knott RN  Outcome: Ongoing     Problem: Cardiac Output - Decreased:  Goal: Hemodynamic stability will improve  Description: Hemodynamic stability will improve  10/29/2021 1254 by Juliana Knott RN  Outcome: Ongoing     Problem: Fluid Volume - Excess:  Goal: Control of fluid volume excess will improve  Description: Control of fluid volume excess will improve  10/29/2021 1254 by Juliana Knott RN  Outcome: Ongoing     Problem: Gas Exchange - Impaired:  Goal: Levels of oxygenation will improve  Description: Levels of oxygenation will improve  10/29/2021 1254 by Juliana Knott RN  Outcome: Ongoing     Problem: Mood - Altered:  Goal: Mood stable  Description: Mood stable  10/29/2021 1254 by Juliana Knott RN  Outcome: Ongoing     Problem: Tissue Perfusion - Cardiopulmonary, Altered:  Goal: Absence of angina  Description: Absence of angina  10/29/2021 1254 by Juliana Knott RN  Outcome: Ongoing     Problem: Tissue Perfusion - Cardiopulmonary, Altered:  Goal: Circulation will improve to fullest extent possible  Description: Circulation will improve to fullest extent possible  10/29/2021 1254 by Juliana Knott RN  Outcome: Ongoing     Problem: Tissue Perfusion - Cardiopulmonary, Altered:  Goal: Hemodynamic stability will improve  Description: Hemodynamic stability will improve  10/29/2021 1254 by Juliana Knott RN  Outcome: Ongoing     Problem: Tobacco Use:  Goal: Will participate in inpatient tobacco-use cessation counseling  Description: Will participate in inpatient tobacco-use cessation counseling  10/29/2021 1254 by Juliana Knott RN  Outcome: Ongoing     Problem: Venous Thromboembolism:  Goal: Will show no signs or symptoms of venous thromboembolism  Description: Will show no signs or symptoms of venous thromboembolism  10/29/2021 1254 by Yulissa Garcia RN  Outcome: Ongoing     Problem: Venous Thromboembolism:  Goal: Absence of signs or symptoms of impaired coagulation  Description: Absence of signs or symptoms of impaired coagulation  10/29/2021 1254 by Yulissa Garcia RN  Outcome: Ongoing     Problem:  Bowel Function - Altered:  Goal: Bowel elimination is within specified parameters  Description: Bowel elimination is within specified parameters  10/29/2021 1254 by Yulissa Garcia RN  Outcome: Ongoing     Problem: Fluid Volume - Imbalance:  Goal: Absence of imbalanced fluid volume signs and symptoms  Description: Absence of imbalanced fluid volume signs and symptoms  10/29/2021 1254 by Yulissa Garcia RN  Outcome: Ongoing     Problem: Fluid Volume - Imbalance:  Goal: Will show no signs and symptoms of excessive bleeding  Description: Will show no signs and symptoms of excessive bleeding  10/29/2021 1254 by Yulissa Garcia RN  Outcome: Ongoing     Problem: Nausea/Vomiting:  Goal: Absence of nausea/vomiting  Description: Absence of nausea/vomiting  10/29/2021 1254 by Yulissa Garcia RN  Outcome: Ongoing     Problem: Nausea/Vomiting:  Goal: Able to drink  Description: Able to drink  10/29/2021 1254 by Yulissa Garcia RN  Outcome: Ongoing     Problem: Nausea/Vomiting:  Goal: Able to eat  Description: Able to eat  10/29/2021 1254 by Yulissa Garcia RN  Outcome: Ongoing     Problem: Nausea/Vomiting:  Goal: Ability to achieve adequate nutritional intake will improve  Description: Ability to achieve adequate nutritional intake will improve  10/29/2021 1254 by Yulissa Garcia RN  Outcome: Ongoing     Problem: Nutrition  Goal: Optimal nutrition therapy  10/29/2021 1254 by Yulissa Garcia RN  Outcome: Ongoing

## 2021-10-29 NOTE — PROGRESS NOTES
Subjective: The patient feels \"good\" this morning. He states he would like to go home. He denies chest discomfort, dyspnea or palpitations. I did review the results of the EGD and colonoscopy from yesterday. I also saw the note from So Root CNP with gastroenterology that indicated that Eliquis may be resumed today. Objective  Vitals: Blood pressure 118/70, heart rate 82, respiratory rate 20  Cardiovascular: S1, S2. Irregularly irregular  Respiratory: Vesicular breath sound  Abdomen: Soft    Impression  1. Longstanding persistent atrial fibrillation. Currently his heart rate is controlled. Eliquis will be resumed today. Next #2    Recommendations   1. Resume Eliquis. 2.  Continue other current medications. 3.  At this point the patient may be discharged home from a cardiac standpoint. We will arrange for appropriate follow-up in the office. Cardiology will sign off. Please call with further questions.

## 2021-10-29 NOTE — PLAN OF CARE
Problem:  Activity Intolerance:  Goal: Ability to tolerate increased activity will improve  Description: Ability to tolerate increased activity will improve  10/28/2021 2359 by Lisa Roberts RN  Outcome: Ongoing     Problem: Fluid Volume - Imbalance:  Goal: Absence of imbalanced fluid volume signs and symptoms  Description: Absence of imbalanced fluid volume signs and symptoms  10/28/2021 2359 by Lisa Roberts RN  Outcome: Ongoing     Problem: Nutrition  Goal: Optimal nutrition therapy  10/28/2021 2359 by Lisa Roberts RN  Outcome: Ongoing

## 2021-10-30 VITALS
HEART RATE: 86 BPM | OXYGEN SATURATION: 97 % | BODY MASS INDEX: 32.64 KG/M2 | WEIGHT: 241 LBS | TEMPERATURE: 97.5 F | DIASTOLIC BLOOD PRESSURE: 68 MMHG | RESPIRATION RATE: 20 BRPM | SYSTOLIC BLOOD PRESSURE: 124 MMHG | HEIGHT: 72 IN

## 2021-10-30 LAB
ABO/RH: NORMAL
ANION GAP SERPL CALCULATED.3IONS-SCNC: 13 MMOL/L (ref 9–17)
ANTIBODY SCREEN: NEGATIVE
ARM BAND NUMBER: NORMAL
BLD PROD TYP BPU: NORMAL
BLD PROD TYP BPU: NORMAL
BUN BLDV-MCNC: 34 MG/DL (ref 8–23)
BUN/CREAT BLD: 20 (ref 9–20)
CALCIUM SERPL-MCNC: 8.7 MG/DL (ref 8.6–10.4)
CHLORIDE BLD-SCNC: 99 MMOL/L (ref 98–107)
CO2: 25 MMOL/L (ref 20–31)
CREAT SERPL-MCNC: 1.68 MG/DL (ref 0.7–1.2)
CROSSMATCH RESULT: NORMAL
CROSSMATCH RESULT: NORMAL
DISPENSE STATUS BLOOD BANK: NORMAL
DISPENSE STATUS BLOOD BANK: NORMAL
EXPIRATION DATE: NORMAL
GFR AFRICAN AMERICAN: 49 ML/MIN
GFR NON-AFRICAN AMERICAN: 40 ML/MIN
GFR SERPL CREATININE-BSD FRML MDRD: ABNORMAL ML/MIN/{1.73_M2}
GFR SERPL CREATININE-BSD FRML MDRD: ABNORMAL ML/MIN/{1.73_M2}
GLUCOSE BLD-MCNC: 163 MG/DL (ref 70–99)
GLUCOSE BLD-MCNC: 175 MG/DL (ref 75–110)
HCT VFR BLD CALC: 27.2 % (ref 40.7–50.3)
HEMOGLOBIN: 7.9 G/DL (ref 13–17)
MAGNESIUM: 2 MG/DL (ref 1.6–2.6)
POTASSIUM SERPL-SCNC: 3.8 MMOL/L (ref 3.7–5.3)
SODIUM BLD-SCNC: 137 MMOL/L (ref 135–144)
TRANSFUSION STATUS: NORMAL
TRANSFUSION STATUS: NORMAL
UNIT DIVISION: 0
UNIT DIVISION: 0
UNIT NUMBER: NORMAL
UNIT NUMBER: NORMAL

## 2021-10-30 PROCEDURE — 80048 BASIC METABOLIC PNL TOTAL CA: CPT

## 2021-10-30 PROCEDURE — 83735 ASSAY OF MAGNESIUM: CPT

## 2021-10-30 PROCEDURE — 85018 HEMOGLOBIN: CPT

## 2021-10-30 PROCEDURE — 6370000000 HC RX 637 (ALT 250 FOR IP): Performed by: INTERNAL MEDICINE

## 2021-10-30 PROCEDURE — 36415 COLL VENOUS BLD VENIPUNCTURE: CPT

## 2021-10-30 PROCEDURE — 6370000000 HC RX 637 (ALT 250 FOR IP): Performed by: FAMILY MEDICINE

## 2021-10-30 PROCEDURE — 6360000002 HC RX W HCPCS: Performed by: INTERNAL MEDICINE

## 2021-10-30 PROCEDURE — 82947 ASSAY GLUCOSE BLOOD QUANT: CPT

## 2021-10-30 PROCEDURE — C9113 INJ PANTOPRAZOLE SODIUM, VIA: HCPCS | Performed by: INTERNAL MEDICINE

## 2021-10-30 PROCEDURE — 85014 HEMATOCRIT: CPT

## 2021-10-30 PROCEDURE — 2580000003 HC RX 258: Performed by: INTERNAL MEDICINE

## 2021-10-30 RX ORDER — PANTOPRAZOLE SODIUM 40 MG/1
40 TABLET, DELAYED RELEASE ORAL
Status: DISCONTINUED | OUTPATIENT
Start: 2021-10-31 | End: 2021-10-30 | Stop reason: HOSPADM

## 2021-10-30 RX ADMIN — FUROSEMIDE 20 MG: 10 INJECTION, SOLUTION INTRAMUSCULAR; INTRAVENOUS at 09:23

## 2021-10-30 RX ADMIN — SPIRONOLACTONE 25 MG: 25 TABLET ORAL at 09:23

## 2021-10-30 RX ADMIN — ATORVASTATIN CALCIUM 10 MG: 10 TABLET, FILM COATED ORAL at 09:23

## 2021-10-30 RX ADMIN — ALLOPURINOL 100 MG: 100 TABLET ORAL at 09:23

## 2021-10-30 RX ADMIN — SODIUM CHLORIDE, PRESERVATIVE FREE 10 ML: 5 INJECTION INTRAVENOUS at 09:23

## 2021-10-30 RX ADMIN — APIXABAN 2.5 MG: 2.5 TABLET, FILM COATED ORAL at 09:23

## 2021-10-30 RX ADMIN — METOPROLOL SUCCINATE 50 MG: 50 TABLET, EXTENDED RELEASE ORAL at 09:23

## 2021-10-30 RX ADMIN — LOSARTAN POTASSIUM 50 MG: 50 TABLET, FILM COATED ORAL at 09:23

## 2021-10-30 RX ADMIN — INSULIN LISPRO 2 UNITS: 100 INJECTION, SOLUTION INTRAVENOUS; SUBCUTANEOUS at 09:25

## 2021-10-30 RX ADMIN — PANTOPRAZOLE SODIUM 40 MG: 40 INJECTION, POWDER, FOR SOLUTION INTRAVENOUS at 09:23

## 2021-10-30 ASSESSMENT — ENCOUNTER SYMPTOMS
BLOOD IN STOOL: 0
ABDOMINAL DISTENTION: 0
SHORTNESS OF BREATH: 0
WHEEZING: 0
COUGH: 0
EYES NEGATIVE: 1

## 2021-10-30 ASSESSMENT — PAIN SCALES - GENERAL
PAINLEVEL_OUTOF10: 0
PAINLEVEL_OUTOF10: 0

## 2021-10-30 NOTE — DISCHARGE INSTR - COC
HOME OXYGEN     You have qualified for home oxygen at 2 liters per [place]. Dalia Lopez      Your oxygen requirements are as follows: nocturnal only    You have been set up with DME agency [facility]     Please call them at [phone] to set up delivery of your equipment     [facility] may have to get approval from your insurance for the nocturnal.

## 2021-10-30 NOTE — PROGRESS NOTES
PROTONIX 40MG IV DAILY  changed from IV to PO per [facility] approved policy. Basic Criteria (please refer to hospital policy for details):  1. functioning GI tract  2. tolerating PO/NG routine medications  2. Antibiotics: Received at least 24 hours of IV, clinical stabilization, afebrile, normalizing WBC)    Thank you.   Gumaro Phoenix, [address] [place] [address] 10/30/2021 2:30 PM

## 2021-10-30 NOTE — PROGRESS NOTES
Review of Systems   Constitutional: Negative for activity change and appetite change. HENT: Negative. Negative for congestion. Eyes: Negative. Respiratory: Negative for cough, shortness of breath and wheezing. Cardiovascular: Negative for chest pain and leg swelling. Gastrointestinal: Negative for abdominal distention and blood in stool. Genitourinary: Negative. Musculoskeletal: Positive for neck stiffness. Neurological: Negative for dizziness. Hematological: Negative. Psychiatric/Behavioral: Negative. Physical Exam  HENT:      Head: Normocephalic. Nose: Nose normal.      Mouth/Throat:      Mouth: Mucous membranes are moist.   Cardiovascular:      Rate and Rhythm: Rhythm irregular. Pulses: Normal pulses. Pulmonary:      Breath sounds: Normal breath sounds. No wheezing. Abdominal:      General: Bowel sounds are normal.      Palpations: Abdomen is soft. Musculoskeletal:      Cervical back: Normal range of motion. Neurological:      Mental Status: He is alert. 1) chf no sob  Edema is better  2) anemia hgb is 7.9  Pt hs adjusted  He did his shower and got dressed with no sob  Has been stable  Will go home on iron and smita hgb in 4 days. David Rees 3) diabetes  bs's 175  4) hypomagnesemia  mag is good 2.0   Ok for discharge  5) apnea  Witnessed in night  Will have sleep study OP  6)Patient was evaluated today for the diagnosis of CHF. I entered a DME order for home oxygen because the diagnosis and testing requires the patient to have supplemental oxygen. Condition will improve or be benefited by oxygen use. The patient qualified for hoem 02 due to CHF>   The need for this equipment was discussed with the patient and he understands and is in agreement.

## 2021-10-30 NOTE — PROGRESS NOTES
PROGRESS NOTE    Admit Date: 10/25/2021         Subjective: feeling a bit better cough persistant sob with activity , no sputum production , no fever chill no cough or congestion no chest pain or sob      Diet: ADULT DIET; Regular; 4 carb choices (60 gm/meal); 1800 ml  Pain is:None  Nausea:None  Bowel Movement/Flatus yes    Data:   Scheduled Meds: Reviewed  Continuous Infusions:   sodium chloride      dextrose      sodium chloride         Intake/Output Summary (Last 24 hours) at 10/29/2021 2236  Last data filed at 10/29/2021 1209  Gross per 24 hour   Intake 100 ml   Output 650 ml   Net -550 ml     Hematology:  Recent Labs     10/27/21  0554 10/28/21  0500 10/29/21  0651   WBC 6.7  --   --    HGB 7.7* 7.6* 8.0*   HCT 25.6* 25.5* 27.2*     --   --      Chemistry:  Recent Labs     10/27/21  0554 10/28/21  0500 10/29/21  0651    136 138   K 3.4* 3.7 3.6*    98 100   CO2 20 25 23   GLUCOSE 95 66* 115*   BUN 42* 31* 28*   CREATININE 1.46* 1.20 1.41*   MG 1.9 1.7 1.8   ANIONGAP 15 13 15   LABGLOM 48* 60* 50*   GFRAA 58* >60 >60   CALCIUM 8.6 8.5* 8.8     Recent Labs     10/27/21  0554 10/28/21  0500   PROT  --  6.4   LABALBU  --  3.6   AST  --  48*   ALT  --  43*   ALKPHOS  --  137*   BILITOT  --  1.11   BILIDIR  --  0.43*   CHOL 106  --    HDL 38*  --    LDLCHOLESTEROL 52  --    CHOLHDLRATIO 2.8  --    TRIG 79  --    VLDL NOT REPORTED  --        -----------------------------------------------------------------  RAD:   [id number]] Collected: 10/29/21 0630      Order Status: Completed Updated: 10/29/21 0833     Narrative:       EXAMINATION:   ONE XRAY VIEW OF THE CHEST     10/29/2021 6:15 am     COMPARISON:   Chest radiograph performed 10/28/2021. HISTORY:   ORDERING SYSTEM PROVIDED HISTORY: chf   TECHNOLOGIST PROVIDED HISTORY:   chf   Reason for Exam: chf   Acuity: Unknown   Type of Exam: Unknown     FINDINGS:   There are trace effusions. Lamona Gail is underlying chronic pulmonary change.    There is no pneumothorax.  The heart is enlarged.  The upper abdomen   unremarkable.  The extrathoracic soft tissues are.      Impression:           Objective:   Vitals: /69   Pulse 91   Temp 98.6 °F (37 °C) (Axillary)   Resp 16   Ht 6' (1.829 m)   Wt 241 lb (109.3 kg)   SpO2 99%   BMI 32.69 kg/m²   General appearance: alert, appears stated age and cooperative  Skin: Skin color, texture, turgor normal.   HEENT: Head: Normocephalic, no lesions, without obvious abnormality. Neck: no adenopathy, no carotid bruit, no JVD, supple, symmetrical, trachea midline and thyroid not enlarged, symmetric, no tenderness/mass/nodules  Lungs: diminished breath sounds bibasilar and bilaterally and rhonchi anterior - bilateral  Heart: irregularly irregular rhythm  Abdomen: soft, non-tender; bowel sounds normal; no masses,  no organomegaly  Extremities: edema one plus and Homans sign is negative, no sign of DVT  Lymphatic: No significant lymph node enlargement papable  Neurologic: Mental status: Alert, oriented, thought content appropriate      Assessment & Plan:    Patient Active Problem List:     Decompensated heart failure (HCC)     Anemia, blood loss     Elevated LFTs     Acute on chronic diastolic chf    Hypokalemia     Liver disease highly suggestive of cirrhosis ,alcohol, cool     typ2 dm      ckd with acute renal insufficiency     Acute gastritis /esophagitis     Colonic polyps       Pulmonary hypertension severe      Permanent atrial fibrillation   See orders   Disposition:   Iv iron today , additional lasix this am check cxr , discussed with patient out patient sleep study   Patient had significant desaturation at night  , home nocturnal o2   Hopefully home tomorrow  Manpreet Mera

## 2021-10-30 NOTE — FLOWSHEET NOTE
Discharge instructions given and explained thoroughly All meds reviewed and also called into [facility] on [address] [facility] [address]. Follow ups addressed. All questions answered and pt and wife,Polly verbalize understanding.  To lobby vioa wheelchair with all belongings

## 2021-11-01 ENCOUNTER — CARE COORDINATION (OUTPATIENT)
Dept: CASE MANAGEMENT | Age: 71
End: 2021-11-01

## 2021-11-01 LAB
CULTURE: NORMAL
CULTURE: NORMAL
Lab: NORMAL
Lab: NORMAL
SPECIMEN DESCRIPTION: NORMAL
SPECIMEN DESCRIPTION: NORMAL

## 2021-11-02 ENCOUNTER — CARE COORDINATION (OUTPATIENT)
Dept: CASE MANAGEMENT | Age: 71
End: 2021-11-02

## 2021-11-02 LAB — SURGICAL PATHOLOGY REPORT: NORMAL

## 2021-11-02 NOTE — CARE COORDINATION
Paul [facility] Transitions Initial Follow Up Call    Call within 2 business days of discharge: Yes    Patient: Crystal Ashby Patient : 1950   MRN: [id number]  Reason for Admission:    Anemia, blood loss        Discharge Date: 10/29/21 RARS: Readmission Risk Score: 13      Last Discharge [address]       Complaint Diagnosis Description Type Department Provider    10/25/21 Other; Atrial Fibrillation Decompensated heart failure ([facility] [address] . .. ED to Hosp-Admission (Discharged) (ADMITTED) [address]; Darrick Metz. .. Second attempt at initial 24 hour CTN call     Spoke with  Called to speak with patient for initial  transition of care. Left HIPPA compliant voice message with contact information [phone] for a call  Back with an update. Facility: [facility]    Non-face-to-face services provided:  Obtained and reviewed discharge summary and/or continuity of care documents    Care Transitions 24 Hour Call    Care Transitions Interventions         Follow Up  No future appointments.     Durga Noel LPN

## 2021-11-22 ENCOUNTER — TELEPHONE (OUTPATIENT)
Dept: GASTROENTEROLOGY | Age: 71
End: 2021-11-22

## 2021-11-22 RX ORDER — AMOXICILLIN 500 MG/1
1000 TABLET, FILM COATED ORAL 2 TIMES DAILY
Qty: 56 TABLET | Refills: 0 | Status: SHIPPED | OUTPATIENT
Start: 2021-11-22 | End: 2021-12-06

## 2021-11-22 RX ORDER — METRONIDAZOLE 250 MG/1
500 TABLET ORAL 3 TIMES DAILY
Qty: 84 TABLET | Refills: 0 | Status: SHIPPED | OUTPATIENT
Start: 2021-11-22 | End: 2021-12-06

## 2021-11-22 NOTE — TELEPHONE ENCOUNTER
Writer called and spoke with pt. He gives permission to speak with his wife regarding care. Writer informed them both of the infection shown on the path report. Patient states PCP already prescribed Biaxin and amoxicillin. Pt took it for 5 days and stopped because the Biaxin was making him really sick. She states PCP was unsure what she should try next. Writer informed her of the medication prescribed by Dr Ally Vance and pharmacy is confirmed. Wife also asks questions regarding the Duarte's diagnosis and polyp results. All questions are answered. Writer attempted to make f/u appt. Pt and wife asks if they can call office at a later time to schedule appt. Phone number is given.

## 2022-01-06 NOTE — DISCHARGE SUMMARY
[address] [facility] [address]                [address] [facility], [address] [place] [address]                               DISCHARGE SUMMARY    PATIENT NAME: Delano Riley                :        1950  MED REC NO:   [id number]                             ROOM:       1001  ACCOUNT NO:   [de-identified]                           ADMIT DATE: 10/25/2021  PROVIDER:     Sheyla Bennett [facility]. COURSE:  This patient was admitted to the hospital with anemia. He is treated for atrial fibrillation and he also has CKD stage 3 as  well as elevated liver functions and anemia. His hemoglobin on  admission was low. On discharge, his hemoglobin was 7.9. His  creatinine on admission was 1.60 and upon discharge, it was 1.41.  _____  When he first came into the hospital in the emergency room, he did  complain of shortness of breath and weakness. He had elevated lactic  acid. EKG showed a rate of 113 with RVR. His ultrasound of the  gallbladder showed trace perihepatic ascites and layering pleural  effusions. His hemoglobin on admission was 7.9. There was a consult  with Dr. Ting Bolanos. CT showed bilateral pleural effusions. The patient  did have a history of drinking. On admission, his hemoglobin was 7.9;  by the second day, it had dropped to 6.8. The GI felt that he would  need an EGD and colonoscopy as an outpatient. The patient was given IV  Lasix, fluid restrictions, IV PPI, IV iron after his transfusions. By  10/27, the patient was feeling a little bit better. After a unit of  PRBCs and iron, his hemoglobin went up to 7.7. By the , he was 8.1  that had dropped down to 7.7. His creatinine ranged from 1.46 to 1.65. X-ray of the hip showed moderate degenerative changes of the right hip,  there was narrowing of the subacromial space. His Eliquis was held and  orders were given to continue to transfuse if his hemoglobin went under  7.   His echo did show severe pulmonary hypertension. His O2 nocturnal  was a little low on several occasions and it was felt that he needed a  sleep study as an outpatient. By 10/28, the patient had a slight cough. His problem list included acute-on-chronic diastolic CHF, GI bleed. He  had a positive Hemoccult, awaiting EGD and colonoscopy. On 10/28, he  did have a colonoscopy, showed ileum was normal; cecum was normal; on  the transverse colon, there were two polyps, one was 6 mm, removed with  cold biopsy. Dr. Maria A Kowalski felt the patient could be discharged and have a  repeat colonoscopy in three years. He also had an EGD. There were no  esophageal varices. There was a linear tongue, salmon-colored mucosa  extending, suspicious for Duarte's and biopsies were taken. By 10/29,  the patient was feeling better, his cough was improving. Potassium was  a little bit low at 3.4. He was given more IV iron and Lasix. After  the unit of iron, he did have a significant desat at night and would be  ordered to have home nocturnal oxygen testing after discharge. He was  discharged in good condition to follow up with Dr. Guillermo Lin in a week  and also Dr. Maria A Kowalski in the next six months.         Chetna Hidalgo    D: 01/05/2022 16:25:51       T: 01/05/2022 23:36:02     [id number]  Job#: [id number]     Doc#: [id number]    CC:

## 2024-01-23 ENCOUNTER — HOSPITAL ENCOUNTER (EMERGENCY)
Facility: CLINIC | Age: 74
Discharge: HOME | End: 2024-01-23
Attending: STUDENT IN AN ORGANIZED HEALTH CARE EDUCATION/TRAINING PROGRAM
Payer: MEDICARE

## 2024-01-23 VITALS
HEART RATE: 102 BPM | WEIGHT: 205 LBS | TEMPERATURE: 97.4 F | SYSTOLIC BLOOD PRESSURE: 122 MMHG | RESPIRATION RATE: 16 BRPM | BODY MASS INDEX: 28.7 KG/M2 | DIASTOLIC BLOOD PRESSURE: 83 MMHG | OXYGEN SATURATION: 99 % | HEIGHT: 71 IN

## 2024-01-23 DIAGNOSIS — H69.91 DYSFUNCTION OF RIGHT EUSTACHIAN TUBE: Primary | ICD-10-CM

## 2024-01-23 PROCEDURE — 99283 EMERGENCY DEPT VISIT LOW MDM: CPT

## 2024-01-23 RX ORDER — FLUTICASONE PROPIONATE 50 MCG
2 SPRAY, SUSPENSION (ML) NASAL DAILY
Qty: 16 G | Refills: 0 | Status: SHIPPED | OUTPATIENT
Start: 2024-01-23

## 2024-01-23 ASSESSMENT — PAIN SCALES - GENERAL
PAINLEVEL_OUTOF10: 4
PAINLEVEL_OUTOF10: 4

## 2024-01-23 NOTE — ED PROVIDER NOTES
[facility] Emergency Department ED  EMERGENCY DEPARTMENT ENCOUNTER  INDEPENDENT ATTESTATION         1. Dysfunction of right eustachian tube          Pt Name: Norman Hardin  MRN: [id number]  Birthdate 1950  Date of evaluation: 1/23/24    Norman Hardin is a 73 y.o. male who presents with Ear Fullness  .    Based on the medical record, the care appears appropriate. I was personally available for consultation in the Emergency Department.      FINAL IMPRESSION      1. Dysfunction of right eustachian tube          DISPOSITION / PLAN     DISPOSITION Decision To Discharge 01/23/2024 02:46:54 PM      PATIENT REFERRED TO:  Dori Rodriguez MD  [address] [facility] [id number] [phone]    Call in 1 day      [facility] ED  [address] [facility] [id number] [phone]    If symptoms worsen      DISCHARGE MEDICATIONS:  Discharge Medication List as of 1/23/2024  2:57 PM        START taking these medications    Details   fluticasone (FLONASE) 50 MCG/ACT nasal spray 2 sprays by Each Nostril route daily, Disp-16 g, R-0Normal             Dr. Lisa Araujo, DO   Attending Emergency Physician      Lisa Araujo DO  01/23/24 2048    
Dispense:  16 g     Refill:  0       Controlled Substances Monitoring:     DIAGNOSTIC RESULTS     EKG: All EKG's are interpreted by the Emergency Department Physician who either signs or Co-signs this chart in the absenceof a cardiologist.        RADIOLOGY: All images are read by the radiologist and their interpretations are reviewed.    No orders to display       No results found.    LABS:  No results found for this visit on 01/23/24.    EMERGENCY DEPARTMENT COURSE           Vitals:    Vitals:    01/23/24 1433   BP: 122/83   Pulse: (!) 102   Resp: 16   Temp: 97.4 °F (36.3 °C)   TempSrc: Oral   SpO2: 99%   Weight: 93 kg (205 lb)   Height: 1.791 m (5' 10.5\")     -------------------------  BP: 122/83, Temp: 97.4 °F (36.3 °C), Pulse: (!) 102, Respirations: 16      RE-EVALUATION:  See ED Course notes above.        CONSULTS:  None    PROCEDURES:  None    FINAL IMPRESSION      1. Dysfunction of right eustachian tube          DISPOSITION / PLAN     CONDITION ON DISPOSITION:   Stable for discharge.     PATIENT REFERRED TO:  Dori Rodriguez MD  [address] [facility] [id number] [phone]    Call in 1 day      [facility] ED  [address] [id number] [phone]    If symptoms worsen      DISCHARGE MEDICATIONS:  Discharge Medication List as of 1/23/2024  2:57 PM        START taking these medications    Details   fluticasone (FLONASE) 50 MCG/ACT nasal spray 2 sprays by Each Nostril route daily, Disp-16 g, R-0Normal             ROSA Hernandez CNP   Emergency Medicine Nurse Practitioner    (Please note that portions of this note were completed with a voice recognition program.  Efforts were made to edit the dictations but occasionally words aremis-transcribed.)        Stu Menon APRN - CNP  01/24/24 0866

## 2024-01-23 NOTE — DISCHARGE INSTRUCTIONS
Please understand that at this time there is no evidence for a more serious underlying process, but that early in the process of an illness or injury, an emergency department workup can be falsely reassuring.  You should contact your family doctor within the next 48 hours for a follow up appointment    THANK YOU!!!    From [facility] and [facility]    On behalf of the Emergency Department staff at [facility], I would like to thank you for giving us the opportunity to address your health care needs and concerns.    We hope that during your visit, our service was delivered in a professional and caring manner. Please keep [facility] in mind as we walk with you down the path to your own personal wellness.     Please expect an automated text message or email from us so we can ask a few questions about your health and progress. Based on your answers, a clinician may call you back to offer help and instructions.    Please understand that early in the process of an illness or injury, an emergency department workup can be falsely reassuring.  If you notice any worsening, changing or persistent symptoms please call your family doctor or return to the ER immediately.     Tell us how we did during your visit at [email or web address]   and let us know about your experience

## 2024-01-24 ASSESSMENT — ENCOUNTER SYMPTOMS
BACK PAIN: 0
SHORTNESS OF BREATH: 0
ABDOMINAL PAIN: 0
NAUSEA: 0
COUGH: 0
VOMITING: 0
DIARRHEA: 0

## (undated) DEVICE — JELLY,LUBE,STERILE,FLIP TOP,TUBE,2-OZ: Brand: MEDLINE

## (undated) DEVICE — GLOVE SURG 8 11.7IN BEAD CUF LIGHT BRN SENSICARE LTX FREE

## (undated) DEVICE — FORCEPS BX L240CM WRK CHN 2.8MM STD CAP W/ NDL MIC MESH

## (undated) DEVICE — CO2 CANNULA,SUPERSOFT, ADLT,7'O2,7'CO2: Brand: MEDLINE

## (undated) DEVICE — Device: Brand: DEFENDO VALVE AND CONNECTOR KIT

## (undated) DEVICE — SNARE ENDOSCP M L240CM LOOP W27MM SHTH DIA2.4MM OVL FLX

## (undated) DEVICE — TRAP EVAC NO 5500 DISPOS-A-TRAP

## (undated) DEVICE — ELECTRODE PT RET AD L9FT HI MOIST COND ADH HYDRGEL CORDED

## (undated) DEVICE — MEDICINE CUP, GRADUATED, STER: Brand: MEDLINE

## (undated) DEVICE — CONMED DISPOSABLE GASTROINTESTINAL CYTOLOGY BRUSH, STRAIGHT HANDLE, 2.5 MM X 160 CM: Brand: CONMED

## (undated) DEVICE — TRAP SURG QUAD PARABOLA SLOT DSGN SFTY SCRN TRAPEASE

## (undated) DEVICE — SINGLE-USE POLYPECTOMY SNARE: Brand: CAPTIFLEX

## (undated) DEVICE — ADAPTER TBNG LUER STUB 15 GA INTMED

## (undated) DEVICE — BASIN EMSIS 700ML GRAPHITE PLAS KID SHP GRAD

## (undated) DEVICE — YANKAUER,FLEXIBLE HANDLE,REGLR CAPACITY: Brand: MEDLINE INDUSTRIES, INC.

## (undated) DEVICE — CUP MED 1OZ CLR POLYPR FEED GRAD W/O LID

## (undated) DEVICE — SYRINGE MED 50ML LUERLOCK TIP

## (undated) DEVICE — TUBING, SUCTION, 1/4" X 12', STRAIGHT: Brand: MEDLINE

## (undated) DEVICE — GOWN,AURORA,NONREINFORCED,LARGE: Brand: MEDLINE

## (undated) DEVICE — GAUZE,SPONGE,4"X4",16PLY,STRL,LF,10/TRAY: Brand: MEDLINE

## (undated) DEVICE — BLOCK BITE 60FR RUBBER ADLT DENTAL